# Patient Record
Sex: FEMALE | Race: BLACK OR AFRICAN AMERICAN | URBAN - METROPOLITAN AREA
[De-identification: names, ages, dates, MRNs, and addresses within clinical notes are randomized per-mention and may not be internally consistent; named-entity substitution may affect disease eponyms.]

---

## 2019-04-23 ENCOUNTER — HOSPITAL ENCOUNTER (INPATIENT)
Age: 39
LOS: 3 days | Discharge: PSYCHIATRIC OR SUBSTANCE USE FACILITY | DRG: 917 | End: 2019-04-26
Attending: EMERGENCY MEDICINE | Admitting: INTERNAL MEDICINE
Payer: SELF-PAY

## 2019-04-23 ENCOUNTER — APPOINTMENT (OUTPATIENT)
Dept: CT IMAGING | Age: 39
DRG: 917 | End: 2019-04-23
Attending: STUDENT IN AN ORGANIZED HEALTH CARE EDUCATION/TRAINING PROGRAM
Payer: SELF-PAY

## 2019-04-23 DIAGNOSIS — R40.0 SOMNOLENCE: Primary | ICD-10-CM

## 2019-04-23 DIAGNOSIS — T43.201A: ICD-10-CM

## 2019-04-23 PROBLEM — R41.82 ALTERED MENTAL STATUS: Status: ACTIVE | Noted: 2019-04-23

## 2019-04-23 PROBLEM — T50.901A DRUG OVERDOSE: Status: ACTIVE | Noted: 2019-04-23

## 2019-04-23 LAB
ALBUMIN SERPL-MCNC: 4.1 G/DL (ref 3.5–5)
ALBUMIN/GLOB SERPL: 1 {RATIO} (ref 1.1–2.2)
ALP SERPL-CCNC: 82 U/L (ref 45–117)
ALT SERPL-CCNC: 22 U/L (ref 12–78)
AMPHET UR QL SCN: NEGATIVE
ANION GAP SERPL CALC-SCNC: 5 MMOL/L (ref 5–15)
APAP SERPL-MCNC: <2 UG/ML (ref 10–30)
APPEARANCE UR: CLEAR
AST SERPL-CCNC: 17 U/L (ref 15–37)
ATRIAL RATE: 63 BPM
BACTERIA URNS QL MICRO: NEGATIVE /HPF
BARBITURATES UR QL SCN: NEGATIVE
BASOPHILS # BLD: 0 K/UL (ref 0–0.1)
BASOPHILS NFR BLD: 0 % (ref 0–1)
BENZODIAZ UR QL: NEGATIVE
BILIRUB SERPL-MCNC: 0.2 MG/DL (ref 0.2–1)
BILIRUB UR QL: NEGATIVE
BUN SERPL-MCNC: 9 MG/DL (ref 6–20)
BUN/CREAT SERPL: 7 (ref 12–20)
CALCIUM SERPL-MCNC: 9.5 MG/DL (ref 8.5–10.1)
CALCULATED P AXIS, ECG09: 64 DEGREES
CALCULATED R AXIS, ECG10: 63 DEGREES
CALCULATED T AXIS, ECG11: 54 DEGREES
CANNABINOIDS UR QL SCN: POSITIVE
CHLORIDE SERPL-SCNC: 110 MMOL/L (ref 97–108)
CO2 SERPL-SCNC: 28 MMOL/L (ref 21–32)
COCAINE UR QL SCN: POSITIVE
COLOR UR: ABNORMAL
COMMENT, HOLDF: NORMAL
CREAT SERPL-MCNC: 1.38 MG/DL (ref 0.55–1.02)
CREAT UR-MCNC: 154 MG/DL
DIAGNOSIS, 93000: NORMAL
DIFFERENTIAL METHOD BLD: ABNORMAL
DRUG SCRN COMMENT,DRGCM: ABNORMAL
EOSINOPHIL # BLD: 0.1 K/UL (ref 0–0.4)
EOSINOPHIL NFR BLD: 1 % (ref 0–7)
EPITH CASTS URNS QL MICRO: ABNORMAL /LPF
ERYTHROCYTE [DISTWIDTH] IN BLOOD BY AUTOMATED COUNT: 15.8 % (ref 11.5–14.5)
ETHANOL SERPL-MCNC: <10 MG/DL
GLOBULIN SER CALC-MCNC: 4.3 G/DL (ref 2–4)
GLUCOSE BLD STRIP.AUTO-MCNC: 103 MG/DL (ref 65–100)
GLUCOSE BLD STRIP.AUTO-MCNC: 109 MG/DL (ref 65–100)
GLUCOSE BLD STRIP.AUTO-MCNC: 112 MG/DL (ref 65–100)
GLUCOSE SERPL-MCNC: 107 MG/DL (ref 65–100)
GLUCOSE UR STRIP.AUTO-MCNC: NEGATIVE MG/DL
HCG SERPL QL: NEGATIVE
HCT VFR BLD AUTO: 41.9 % (ref 35–47)
HGB BLD-MCNC: 13.3 G/DL (ref 11.5–16)
HGB UR QL STRIP: NEGATIVE
HYALINE CASTS URNS QL MICRO: ABNORMAL /LPF (ref 0–5)
IMM GRANULOCYTES # BLD AUTO: 0.1 K/UL (ref 0–0.04)
IMM GRANULOCYTES NFR BLD AUTO: 1 % (ref 0–0.5)
KETONES UR QL STRIP.AUTO: NEGATIVE MG/DL
LEUKOCYTE ESTERASE UR QL STRIP.AUTO: ABNORMAL
LYMPHOCYTES # BLD: 1.7 K/UL (ref 0.8–3.5)
LYMPHOCYTES NFR BLD: 15 % (ref 12–49)
MCH RBC QN AUTO: 28.8 PG (ref 26–34)
MCHC RBC AUTO-ENTMCNC: 31.7 G/DL (ref 30–36.5)
MCV RBC AUTO: 90.7 FL (ref 80–99)
METHADONE UR QL: NEGATIVE
MONOCYTES # BLD: 0.4 K/UL (ref 0–1)
MONOCYTES NFR BLD: 4 % (ref 5–13)
NEUTS SEG # BLD: 9 K/UL (ref 1.8–8)
NEUTS SEG NFR BLD: 79 % (ref 32–75)
NITRITE UR QL STRIP.AUTO: NEGATIVE
NRBC # BLD: 0 K/UL (ref 0–0.01)
NRBC BLD-RTO: 0 PER 100 WBC
OPIATES UR QL: NEGATIVE
P-R INTERVAL, ECG05: 138 MS
PCP UR QL: NEGATIVE
PH UR STRIP: 7 [PH] (ref 5–8)
PLATELET # BLD AUTO: 376 K/UL (ref 150–400)
PMV BLD AUTO: 9.6 FL (ref 8.9–12.9)
POTASSIUM SERPL-SCNC: 4.7 MMOL/L (ref 3.5–5.1)
PROT SERPL-MCNC: 8.4 G/DL (ref 6.4–8.2)
PROT UR STRIP-MCNC: NEGATIVE MG/DL
Q-T INTERVAL, ECG07: 438 MS
QRS DURATION, ECG06: 84 MS
QTC CALCULATION (BEZET), ECG08: 448 MS
RBC # BLD AUTO: 4.62 M/UL (ref 3.8–5.2)
RBC #/AREA URNS HPF: ABNORMAL /HPF (ref 0–5)
SALICYLATES SERPL-MCNC: <1.7 MG/DL (ref 2.8–20)
SAMPLES BEING HELD,HOLD: NORMAL
SERVICE CMNT-IMP: ABNORMAL
SODIUM SERPL-SCNC: 143 MMOL/L (ref 136–145)
SP GR UR REFRACTOMETRY: 1.01 (ref 1–1.03)
UROBILINOGEN UR QL STRIP.AUTO: 0.2 EU/DL (ref 0.2–1)
VENTRICULAR RATE, ECG03: 63 BPM
WBC # BLD AUTO: 11.3 K/UL (ref 3.6–11)
WBC URNS QL MICRO: ABNORMAL /HPF (ref 0–4)

## 2019-04-23 PROCEDURE — 93005 ELECTROCARDIOGRAM TRACING: CPT

## 2019-04-23 PROCEDURE — 84133 ASSAY OF URINE POTASSIUM: CPT

## 2019-04-23 PROCEDURE — 74011250636 HC RX REV CODE- 250/636: Performed by: INTERNAL MEDICINE

## 2019-04-23 PROCEDURE — 81001 URINALYSIS AUTO W/SCOPE: CPT

## 2019-04-23 PROCEDURE — 51701 INSERT BLADDER CATHETER: CPT

## 2019-04-23 PROCEDURE — 36415 COLL VENOUS BLD VENIPUNCTURE: CPT

## 2019-04-23 PROCEDURE — 96374 THER/PROPH/DIAG INJ IV PUSH: CPT

## 2019-04-23 PROCEDURE — 82570 ASSAY OF URINE CREATININE: CPT

## 2019-04-23 PROCEDURE — 74011000258 HC RX REV CODE- 258: Performed by: INTERNAL MEDICINE

## 2019-04-23 PROCEDURE — 84703 CHORIONIC GONADOTROPIN ASSAY: CPT

## 2019-04-23 PROCEDURE — 80053 COMPREHEN METABOLIC PANEL: CPT

## 2019-04-23 PROCEDURE — 65660000000 HC RM CCU STEPDOWN

## 2019-04-23 PROCEDURE — 74011250636 HC RX REV CODE- 250/636: Performed by: EMERGENCY MEDICINE

## 2019-04-23 PROCEDURE — 99285 EMERGENCY DEPT VISIT HI MDM: CPT

## 2019-04-23 PROCEDURE — 84300 ASSAY OF URINE SODIUM: CPT

## 2019-04-23 PROCEDURE — 77030013079 HC BLNKT BAIR HGGR 3M -A

## 2019-04-23 PROCEDURE — 80307 DRUG TEST PRSMV CHEM ANLYZR: CPT

## 2019-04-23 PROCEDURE — 82962 GLUCOSE BLOOD TEST: CPT

## 2019-04-23 PROCEDURE — 77030005563 HC CATH URETH INT MMGH -A

## 2019-04-23 PROCEDURE — 85025 COMPLETE CBC W/AUTO DIFF WBC: CPT

## 2019-04-23 PROCEDURE — 70450 CT HEAD/BRAIN W/O DYE: CPT

## 2019-04-23 RX ORDER — SODIUM CHLORIDE 0.9 % (FLUSH) 0.9 %
5-40 SYRINGE (ML) INJECTION EVERY 8 HOURS
Status: DISCONTINUED | OUTPATIENT
Start: 2019-04-23 | End: 2019-04-26 | Stop reason: HOSPADM

## 2019-04-23 RX ORDER — DEXTROSE MONOHYDRATE AND SODIUM CHLORIDE 5; .45 G/100ML; G/100ML
75 INJECTION, SOLUTION INTRAVENOUS CONTINUOUS
Status: DISCONTINUED | OUTPATIENT
Start: 2019-04-23 | End: 2019-04-25

## 2019-04-23 RX ORDER — SODIUM CHLORIDE 0.9 % (FLUSH) 0.9 %
5-40 SYRINGE (ML) INJECTION AS NEEDED
Status: DISCONTINUED | OUTPATIENT
Start: 2019-04-23 | End: 2019-04-26 | Stop reason: HOSPADM

## 2019-04-23 RX ORDER — INSULIN LISPRO 100 [IU]/ML
INJECTION, SOLUTION INTRAVENOUS; SUBCUTANEOUS
Status: DISCONTINUED | OUTPATIENT
Start: 2019-04-23 | End: 2019-04-26 | Stop reason: HOSPADM

## 2019-04-23 RX ORDER — HEPARIN SODIUM 5000 [USP'U]/ML
5000 INJECTION, SOLUTION INTRAVENOUS; SUBCUTANEOUS EVERY 8 HOURS
Status: DISCONTINUED | OUTPATIENT
Start: 2019-04-23 | End: 2019-04-26 | Stop reason: HOSPADM

## 2019-04-23 RX ORDER — DEXTROSE MONOHYDRATE 25 G/50ML
12.5-25 INJECTION, SOLUTION INTRAVENOUS AS NEEDED
Status: DISCONTINUED | OUTPATIENT
Start: 2019-04-23 | End: 2019-04-26 | Stop reason: HOSPADM

## 2019-04-23 RX ORDER — NALOXONE HYDROCHLORIDE 1 MG/ML
2 INJECTION INTRAMUSCULAR; INTRAVENOUS; SUBCUTANEOUS
Status: COMPLETED | OUTPATIENT
Start: 2019-04-23 | End: 2019-04-23

## 2019-04-23 RX ORDER — DEXTROSE MONOHYDRATE AND SODIUM CHLORIDE 5; .9 G/100ML; G/100ML
75 INJECTION, SOLUTION INTRAVENOUS CONTINUOUS
Status: DISCONTINUED | OUTPATIENT
Start: 2019-04-23 | End: 2019-04-23

## 2019-04-23 RX ORDER — MAGNESIUM SULFATE 100 %
4 CRYSTALS MISCELLANEOUS AS NEEDED
Status: DISCONTINUED | OUTPATIENT
Start: 2019-04-23 | End: 2019-04-26 | Stop reason: HOSPADM

## 2019-04-23 RX ADMIN — NALOXONE HYDROCHLORIDE 2 MG: 1 INJECTION PARENTERAL at 10:18

## 2019-04-23 RX ADMIN — Medication 10 ML: at 21:07

## 2019-04-23 RX ADMIN — DEXTROSE MONOHYDRATE AND SODIUM CHLORIDE 75 ML/HR: 5; .45 INJECTION, SOLUTION INTRAVENOUS at 19:36

## 2019-04-23 RX ADMIN — HEPARIN SODIUM 5000 UNITS: 5000 INJECTION INTRAVENOUS; SUBCUTANEOUS at 21:08

## 2019-04-23 RX ADMIN — Medication 10 ML: at 19:36

## 2019-04-23 NOTE — PROGRESS NOTES
New admission, pt alert, only oriented to self, family at bedside. Pt states her name is Luis Felipe Decker but to call her Josseline Bearded. Pt cannot tell writer her , current month/year, situation current location. Pt states she does not have ID. Using Repetitive word, \"Kenia\". Unable to complete admission data as pt is only alert to self. Sitter at bedside. Pt made comfortable will continue to monitor pt.

## 2019-04-23 NOTE — ED PROVIDER NOTES
EMERGENCY DEPARTMENT HISTORY AND PHYSICAL EXAM      Date: 4/23/2019  Patient Name: Verla Hodgkin Doe    History of Presenting Illness     Chief Complaint   Patient presents with    Drug Overdose     pt found in hotel possibly took 18 seroquel, family found pt       History Provided By: EMS    HPI: Concetta Dickerson, 42 y.o. female with PMHx significant for unknown diagnoses, presents via EMS to the ED with cc of altered mental status secondary to presumed drug overdose. Per EMS report, patient was found at a hotel with pill bottles of Seroquel and Baclofen nearby. She was known to be minimally responsive on initial evaluation, but had appropriate responses to sternal rub. Patient presents protecting her airway, bilateral breath sounds presents, and intact distal pulses. There are no other complaints, changes, or physical findings at this time. PCP: No primary care provider on file. No current facility-administered medications on file prior to encounter. No current outpatient medications on file prior to encounter. Past History     Past Medical History:  No past medical history on file. Past Surgical History:  No past surgical history on file. Family History:  No family history on file. Social History:  Social History     Tobacco Use    Smoking status: Not on file   Substance Use Topics    Alcohol use: Not on file    Drug use: Not on file       Allergies: Allergies not on file      Review of Systems   Review of Systems   Unable to perform ROS: Patient unresponsive       Physical Exam   Physical Exam   Constitutional: She appears well-developed and well-nourished. She appears lethargic. HENT:   Head: Normocephalic and atraumatic. Eyes: Pupils are equal, round, and reactive to light. Conjunctivae and EOM are normal.   Neck: Normal range of motion. Neck supple. Cardiovascular: Normal rate, regular rhythm and normal heart sounds.    Pulmonary/Chest: Effort normal and breath sounds normal. Abdominal: Soft. Bowel sounds are normal.   Musculoskeletal: She exhibits no edema or deformity. Neurological: She appears lethargic. She is disoriented. GCS eye subscore is 2. GCS verbal subscore is 2. GCS motor subscore is 4. Skin: Skin is warm and dry. She is not diaphoretic. Diagnostic Study Results     Labs -     Recent Results (from the past 12 hour(s))   GLUCOSE, POC    Collection Time: 04/23/19  9:56 AM   Result Value Ref Range    Glucose (POC) 103 (H) 65 - 100 mg/dL    Performed by Alda Guillen    CBC WITH AUTOMATED DIFF    Collection Time: 04/23/19 10:00 AM   Result Value Ref Range    WBC 11.3 (H) 3.6 - 11.0 K/uL    RBC 4.62 3.80 - 5.20 M/uL    HGB 13.3 11.5 - 16.0 g/dL    HCT 41.9 35.0 - 47.0 %    MCV 90.7 80.0 - 99.0 FL    MCH 28.8 26.0 - 34.0 PG    MCHC 31.7 30.0 - 36.5 g/dL    RDW 15.8 (H) 11.5 - 14.5 %    PLATELET 705 941 - 769 K/uL    MPV 9.6 8.9 - 12.9 FL    NRBC 0.0 0  WBC    ABSOLUTE NRBC 0.00 0.00 - 0.01 K/uL    NEUTROPHILS 79 (H) 32 - 75 %    LYMPHOCYTES 15 12 - 49 %    MONOCYTES 4 (L) 5 - 13 %    EOSINOPHILS 1 0 - 7 %    BASOPHILS 0 0 - 1 %    IMMATURE GRANULOCYTES 1 (H) 0.0 - 0.5 %    ABS. NEUTROPHILS 9.0 (H) 1.8 - 8.0 K/UL    ABS. LYMPHOCYTES 1.7 0.8 - 3.5 K/UL    ABS. MONOCYTES 0.4 0.0 - 1.0 K/UL    ABS. EOSINOPHILS 0.1 0.0 - 0.4 K/UL    ABS. BASOPHILS 0.0 0.0 - 0.1 K/UL    ABS. IMM.  GRANS. 0.1 (H) 0.00 - 0.04 K/UL    DF AUTOMATED     DRUG SCREEN, URINE    Collection Time: 04/23/19 10:00 AM   Result Value Ref Range    AMPHETAMINES NEGATIVE  NEG      BARBITURATES NEGATIVE  NEG      BENZODIAZEPINES NEGATIVE  NEG      COCAINE POSITIVE (A) NEG      METHADONE NEGATIVE  NEG      OPIATES NEGATIVE  NEG      PCP(PHENCYCLIDINE) NEGATIVE  NEG      THC (TH-CANNABINOL) POSITIVE (A) NEG      Drug screen comment (NOTE)    METABOLIC PANEL, COMPREHENSIVE    Collection Time: 04/23/19 10:00 AM   Result Value Ref Range    Sodium 143 136 - 145 mmol/L    Potassium 4.7 3.5 - 5.1 mmol/L Chloride 110 (H) 97 - 108 mmol/L    CO2 28 21 - 32 mmol/L    Anion gap 5 5 - 15 mmol/L    Glucose 107 (H) 65 - 100 mg/dL    BUN 9 6 - 20 MG/DL    Creatinine 1.38 (H) 0.55 - 1.02 MG/DL    BUN/Creatinine ratio 7 (L) 12 - 20      GFR est AA 50 (L) >60 ml/min/1.73m2    GFR est non-AA 42 (L) >60 ml/min/1.73m2    Calcium 9.5 8.5 - 10.1 MG/DL    Bilirubin, total 0.2 0.2 - 1.0 MG/DL    ALT (SGPT) 22 12 - 78 U/L    AST (SGOT) 17 15 - 37 U/L    Alk. phosphatase 82 45 - 117 U/L    Protein, total 8.4 (H) 6.4 - 8.2 g/dL    Albumin 4.1 3.5 - 5.0 g/dL    Globulin 4.3 (H) 2.0 - 4.0 g/dL    A-G Ratio 1.0 (L) 1.1 - 2.2     SAMPLES BEING HELD    Collection Time: 04/23/19 10:00 AM   Result Value Ref Range    SAMPLES BEING HELD RED     COMMENT        Add-on orders for these samples will be processed based on acceptable specimen integrity and analyte stability, which may vary by analyte. HCG QL SERUM    Collection Time: 04/23/19 10:00 AM   Result Value Ref Range    HCG, Ql. NEGATIVE  NEG     ETHYL ALCOHOL    Collection Time: 04/23/19 10:04 AM   Result Value Ref Range    ALCOHOL(ETHYL),SERUM <93 <40 MG/DL   SALICYLATE    Collection Time: 04/23/19 10:04 AM   Result Value Ref Range    Salicylate level <6.6 (L) 2.8 - 20.0 MG/DL   ACETAMINOPHEN    Collection Time: 04/23/19 10:04 AM   Result Value Ref Range    Acetaminophen level <2 (L) 10 - 30 ug/mL       Radiologic Studies -   No orders to display     CT Results  (Last 48 hours)    None        CXR Results  (Last 48 hours)    None            Medical Decision Making   I am the first provider for this patient. I reviewed the vital signs, available nursing notes, past medical history, past surgical history, family history and social history. Vital Signs-Reviewed the patient's vital signs.   Patient Vitals for the past 12 hrs:   Temp Pulse Resp BP SpO2   04/23/19 1054  61   98 %   04/23/19 1036  72   97 %   04/23/19 1030  67  142/89 98 %   04/23/19 1008  68   98 %   04/23/19 1000  66  127/81 99 %   04/23/19 0958  71   98 %   04/23/19 0957 95.7 °F (35.4 °C) 62 22 129/82 98 %   04/23/19 0955    129/82        Pulse Oximetry Analysis - 99% on room air    Cardiac Monitor:   Rate: 65 bpm  Rhythm: Normal Sinus Rhythm     EKG interpretation: (Preliminary)  Rhythm: normal sinus rhythm; and regular . Rate (approx.): 60; Axis: normal; ME interval: normal; QRS interval: normal ; ST/T wave: T wave inverted; Other findings: borderline ekg. Records Reviewed: Ambulance Run Sheet    Provider Notes (Medical Decision Making):   Patient Miriam Brown is a ~46 YOF who presents with the above stated history, ROS, and physical exam. Patient currently protecting her airway, bilateral breath sounds present, intact distal pulses, and GCS of 8. Current clinical concern for drug overdose (intentional versus accidental) versus suicide attempt. Poison control contacted with recommendations to monitor for 6 hours, keep eyes on QTc intervals, and reassessments for CNS hyperactivity such as seizures. CBC, CMP, UA, urine drug screen, and EKG ordered. Will continue to monitor and reassess. Reassessment @ 13:11:  Patient continues to be somnolent with responses to noxious stimuli. Patient cannot answer questions appropriately. Pending reassessment. Reassessment @ 15:22:  Patient responding to verbal questions. Cannot answer orientation questions related to place, time, or situation. Will look to have patient admitted to the hospital for altered mental status and possible intentional overdose. CONSULT NOTE:   3:26 PM  NEYMAR Lau M.D. spoke with Dr. Anamaria Matute,   Specialty: Hospitalist  Discussed pt's hx, disposition, and available diagnostic and imaging results. Reviewed care plans. Consultant will evaluate pt for admission. ED Course:   Initial assessment performed. The patients presenting problems have been discussed, and they are in agreement with the care plan formulated and outlined with them. I have encouraged them to ask questions as they arise throughout their visit. Critical Care Time:   30 mins    Disposition:  Admission    PLAN:  1. Hospital admission    Diagnosis     Clinical Impression:   1. Somnolence    2. Antidepressant overdose, accidental or unintentional, initial encounter      Lorena Urrutia. Chacha Barajas M.D.  [facility] Emergency Medicine, PGY-2  Work phone: [phone]    Attestations:    I personally performed a history and physical examination of the patient and discussed the management with the resident. I have found the following on physical exam:    No acute distress, regular rate and rhythm, lungs clear, lethargic    I have reviewed the resident's note and agree with the resident's findings, including all diagnostic interpretations, treatment and plan of care, except as documented below. I was present during the key portions of separately billed procedures.     Ed Ricci MD

## 2019-04-23 NOTE — ED TRIAGE NOTES
Pt arrives via EMS with c/o drug over dose, per EMS pt was found by family and potentially took 18 mg of seroquel. Pt responds to pain only. Vitals WDL. MD at bedside to evaluate pt at this time.  on arrival. 
 
Pt placed x3 on monitor.

## 2019-04-23 NOTE — H&P
Hospitalist Admission NoteNAME: Vera Angel :  1973 MRN:  [id number] Date/Time:  2019 4:28 PM 
 
Patient PCP: No primary care provider on file. 
________________________________________________________________________ Given the patient's current clinical presentation, I have a high level of concern for decompensation if discharged from the emergency department. Complex decision making was performed, which includes reviewing the patient's available past medical records, laboratory results, and x-ray films. My assessment of this patient's clinical condition and my plan of care is as follows. Assessment / Plan: Altered mental status most likely secondary to Seroquel overdose versus cocaine abuse Per EMS , pt took 18tabs of seroquel  And UDS + cocaine Ct scan brain negative . Will do neuro-checks q4h , EKG q4H x3 then one on AM  
suical precautions . 1:1 as unable to assess if pt is suicidal  
Psych consulted . If persistent AMS in am  , consult neuro Will get b12 folate ammonia, UA , TSH  to be complete Keep npo until more alert and awake Avoid qtc prolonging agents , hold all PO home  meds Acute kidney injury , pre-renal  
Creat 1.38m, will get UA urine electrolytes H/o DM2 Bs ok Sliding scale for now Get med rec Bipolar disease On seroquel and baclofen Get med rec NOK : unable to obtain There is no height or weight on file to calculate BMI. therefore classifying patient as unable to get I have personally reviewed the radiographs, laboratory data in Epic and decisions and statements above are based partially on this personal interpretation. Code Status: Full Code DVT Prophylaxis: Hep SQ 
GI Prophylaxis: not indicated Subjective: CHIEF COMPLAINT: sent by EMS for AMS HISTORY OF PRESENT ILLNESS:    
Nazario Monsivais is a 42 y.o.    female with known history as listed below presents to ED with complaint noted above. Available records were reviewed at the time of H&P. This is a 44-year-old female with past medical history of bipolar disease, diabetes was found semi conscious sitting in a chair  at her home by the EMS, family at the scene reported that patient vomited in the bathroom and was found in her current condition when they came in the room and they suspect that she took approximately 25 Seroquel that she was prescribed.  Per EMS note, family reported that patient  Does not  have a history of drug abuse or suicide . When seen, no family at bedside and no contact information on file. Unclear baseline mental status Review of her labs showed that her UDS was positive for cocaine and cannabinol .  Her labs revealed acute kidney injury Her EKG showed a QTC of 440, Poison control was called by the ED physician, poison control  Advised 6 hours of observation and frequent QTC check CT scan of the brain was negative for any acute pathology. Review of systems unable to be obtained as patient is still confused We were asked to see for work up and evaluation of the above problems. No past medical history on file. No past surgical history on file. Social History Tobacco Use  Smoking status: Not on file Substance Use Topics  Alcohol use: Not on file History reviewed. No pertinent family history. Allergies not on file Prior to Admission medications Not on File REVIEW OF SYSTEMS:  See HPI for details Patient was not able to provide review of systems due to mental status change/acute illness Objective: VITALS:   
Visit Vitals /68 Pulse 63 Temp 97.8 °F (36.6 °C) Resp 24 SpO2 94% PHYSICAL EXAM:  
General:    Sleepy but arousable , follow some commands , no distress, appears stated age. HEENT: Atraumatic, anicteric sclerae, pink conjunctivae No oral ulcers, mucosa moist, throat clear Neck:  Supple, symmetrical,  thyroid: non tender Lungs:   Clear to auscultation bilaterally. No Wheezing or Rhonchi. No rales. Chest wall:  No tenderness  No Accessory muscle use. Heart:   Regular  rhythm,  No  murmur   No edema Abdomen:   Soft, non-tender. Not distended. Bowel sounds normal 
Extremities: No cyanosis. No clubbing Skin:     Not pale. Not Jaundiced  No rashes Psych:  Unable to assess Neurologic: EOMs intact. No facial asymmetry. No aphasia or slurred speech. Symmetrical strength, Alert and oriented X 0. 
_______________________________________________________________________ Care Plan discussed with: 
  Comments Patient x Seen and examined Family RN x Care Manager Consultant:  chase MONTIEL MD  
_______________________________________________________________________ Recommended Disposition:  
Home with Family HH/PT/OT/RN   
SNF/LTC   
GARCIA   
________________________________________________________________________ TOTAL TIME:  65 Minutes Critical Care Provided     Minutes non procedure based Comments >50% of visit spent in counseling and coordination of care x Chart review Discussion with patient and/or family and questions answered  
 
________________________________________________________________________ Signed: Bebeto Sloan MD 
 
This note will not be viewable in [address] Procedures: see electronic medical records for all procedures/Xrays and details which were not copied into this note but were reviewed prior to creation of Plan. LAB DATA REVIEWED:   
Recent Results (from the past 24 hour(s)) GLUCOSE, POC Collection Time: 04/23/19  9:56 AM  
Result Value Ref Range Glucose (POC) 103 (H) 65 - 100 mg/dL Performed by [facility] EKG, 12 LEAD, INITIAL Collection Time: 04/23/19  9:57 AM  
Result Value Ref Range Ventricular Rate 63 BPM  
 Atrial Rate 63 BPM  
 P-R Interval 138 ms QRS Duration 84 ms Q-T Interval 438 ms QTC Calculation (Bezet) 448 ms Calculated P Axis 64 degrees Calculated R Axis 63 degrees Calculated T Axis 54 degrees Diagnosis Normal sinus rhythm Possible Left atrial enlargement ST elevation, consider early repolarization, pericarditis, or injury Confirmed by Jhon Buck ([id number]) on 4/23/2019 1:51:50 PM 
  
CBC WITH AUTOMATED DIFF Collection Time: 04/23/19 10:00 AM  
Result Value Ref Range WBC 11.3 (H) 3.6 - 11.0 K/uL  
 RBC 4.62 3.80 - 5.20 M/uL  
 HGB 13.3 11.5 - 16.0 g/dL HCT 41.9 35.0 - 47.0 % MCV 90.7 80.0 - 99.0 FL  
 MCH 28.8 26.0 - 34.0 PG  
 MCHC 31.7 30.0 - 36.5 g/dL  
 RDW 15.8 (H) 11.5 - 14.5 % PLATELET 468 576 - 128 K/uL MPV 9.6 8.9 - 12.9 FL  
 NRBC 0.0 0  WBC ABSOLUTE NRBC 0.00 0.00 - 0.01 K/uL NEUTROPHILS 79 (H) 32 - 75 % LYMPHOCYTES 15 12 - 49 % MONOCYTES 4 (L) 5 - 13 % EOSINOPHILS 1 0 - 7 % BASOPHILS 0 0 - 1 % IMMATURE GRANULOCYTES 1 (H) 0.0 - 0.5 % ABS. NEUTROPHILS 9.0 (H) 1.8 - 8.0 K/UL  
 ABS. LYMPHOCYTES 1.7 0.8 - 3.5 K/UL  
 ABS. MONOCYTES 0.4 0.0 - 1.0 K/UL  
 ABS. EOSINOPHILS 0.1 0.0 - 0.4 K/UL  
 ABS. BASOPHILS 0.0 0.0 - 0.1 K/UL  
 ABS. IMM. GRANS. 0.1 (H) 0.00 - 0.04 K/UL  
 DF AUTOMATED    
DRUG SCREEN, URINE Collection Time: 04/23/19 10:00 AM  
Result Value Ref Range AMPHETAMINES NEGATIVE  NEG    
 BARBITURATES NEGATIVE  NEG BENZODIAZEPINES NEGATIVE  NEG    
 COCAINE POSITIVE (A) NEG METHADONE NEGATIVE  NEG    
 OPIATES NEGATIVE  NEG    
 PCP(PHENCYCLIDINE) NEGATIVE  NEG    
 THC (TH-CANNABINOL) POSITIVE (A) NEG Drug screen comment (NOTE) METABOLIC PANEL, COMPREHENSIVE Collection Time: 04/23/19 10:00 AM  
Result Value Ref Range Sodium 143 136 - 145 mmol/L Potassium 4.7 3.5 - 5.1 mmol/L Chloride 110 (H) 97 - 108 mmol/L  
 CO2 28 21 - 32 mmol/L Anion gap 5 5 - 15 mmol/L Glucose 107 (H) 65 - 100 mg/dL  BUN 9 6 - 20 MG/DL  
 Creatinine 1.38 (H) 0.55 - 1.02 MG/DL  
 BUN/Creatinine ratio 7 (L) 12 - 20 GFR est AA 50 (L) >60 ml/min/1.73m2 GFR est non-AA 42 (L) >60 ml/min/1.73m2 Calcium 9.5 8.5 - 10.1 MG/DL Bilirubin, total 0.2 0.2 - 1.0 MG/DL  
 ALT (SGPT) 22 12 - 78 U/L  
 AST (SGOT) 17 15 - 37 U/L Alk. phosphatase 82 45 - 117 U/L Protein, total 8.4 (H) 6.4 - 8.2 g/dL Albumin 4.1 3.5 - 5.0 g/dL Globulin 4.3 (H) 2.0 - 4.0 g/dL A-G Ratio 1.0 (L) 1.1 - 2.2 SAMPLES BEING HELD Collection Time: 04/23/19 10:00 AM  
Result Value Ref Range SAMPLES BEING HELD RED COMMENT Add-on orders for these samples will be processed based on acceptable specimen integrity and analyte stability, which may vary by analyte. HCG QL SERUM Collection Time: 04/23/19 10:00 AM  
Result Value Ref Range HCG, Ql. NEGATIVE  NEG    
ETHYL ALCOHOL Collection Time: 04/23/19 10:04 AM  
Result Value Ref Range ALCOHOL(ETHYL),SERUM <35 <17 MG/DL  
SALICYLATE Collection Time: 04/23/19 10:04 AM  
Result Value Ref Range Salicylate level <4.2 (L) 2.8 - 20.0 MG/DL  
ACETAMINOPHEN Collection Time: 04/23/19 10:04 AM  
Result Value Ref Range Acetaminophen level <2 (L) 10 - 30 ug/mL

## 2019-04-24 LAB
AMMONIA PLAS-SCNC: 27 UMOL/L
ANION GAP SERPL CALC-SCNC: 6 MMOL/L (ref 5–15)
ATRIAL RATE: 61 BPM
BASOPHILS # BLD: 0 K/UL (ref 0–0.1)
BASOPHILS NFR BLD: 0 % (ref 0–1)
BUN SERPL-MCNC: 10 MG/DL (ref 6–20)
BUN/CREAT SERPL: 8 (ref 12–20)
CALCIUM SERPL-MCNC: 8.9 MG/DL (ref 8.5–10.1)
CALCULATED P AXIS, ECG09: 61 DEGREES
CALCULATED R AXIS, ECG10: 54 DEGREES
CALCULATED T AXIS, ECG11: 54 DEGREES
CHLORIDE SERPL-SCNC: 110 MMOL/L (ref 97–108)
CO2 SERPL-SCNC: 27 MMOL/L (ref 21–32)
CREAT SERPL-MCNC: 1.2 MG/DL (ref 0.55–1.02)
DIAGNOSIS, 93000: NORMAL
DIFFERENTIAL METHOD BLD: ABNORMAL
EOSINOPHIL # BLD: 0.1 K/UL (ref 0–0.4)
EOSINOPHIL NFR BLD: 1 % (ref 0–7)
ERYTHROCYTE [DISTWIDTH] IN BLOOD BY AUTOMATED COUNT: 15.9 % (ref 11.5–14.5)
FOLATE SERPL-MCNC: 14.1 NG/ML (ref 5–21)
GLUCOSE BLD STRIP.AUTO-MCNC: 101 MG/DL (ref 65–100)
GLUCOSE BLD STRIP.AUTO-MCNC: 108 MG/DL (ref 65–100)
GLUCOSE BLD STRIP.AUTO-MCNC: 88 MG/DL (ref 65–100)
GLUCOSE SERPL-MCNC: 95 MG/DL (ref 65–100)
HCT VFR BLD AUTO: 38 % (ref 35–47)
HGB BLD-MCNC: 12.2 G/DL (ref 11.5–16)
IMM GRANULOCYTES # BLD AUTO: 0 K/UL (ref 0–0.04)
IMM GRANULOCYTES NFR BLD AUTO: 0 % (ref 0–0.5)
LYMPHOCYTES # BLD: 2.7 K/UL (ref 0.8–3.5)
LYMPHOCYTES NFR BLD: 23 % (ref 12–49)
MCH RBC QN AUTO: 28.5 PG (ref 26–34)
MCHC RBC AUTO-ENTMCNC: 32.1 G/DL (ref 30–36.5)
MCV RBC AUTO: 88.8 FL (ref 80–99)
MONOCYTES # BLD: 0.7 K/UL (ref 0–1)
MONOCYTES NFR BLD: 6 % (ref 5–13)
NEUTS SEG # BLD: 8.4 K/UL (ref 1.8–8)
NEUTS SEG NFR BLD: 70 % (ref 32–75)
NRBC # BLD: 0 K/UL (ref 0–0.01)
NRBC BLD-RTO: 0 PER 100 WBC
P-R INTERVAL, ECG05: 152 MS
PLATELET # BLD AUTO: 381 K/UL (ref 150–400)
PMV BLD AUTO: 9.5 FL (ref 8.9–12.9)
POTASSIUM SERPL-SCNC: 3.8 MMOL/L (ref 3.5–5.1)
POTASSIUM UR-SCNC: 99 MMOL/L
Q-T INTERVAL, ECG07: 438 MS
QRS DURATION, ECG06: 78 MS
QTC CALCULATION (BEZET), ECG08: 440 MS
RBC # BLD AUTO: 4.28 M/UL (ref 3.8–5.2)
SERVICE CMNT-IMP: ABNORMAL
SERVICE CMNT-IMP: ABNORMAL
SERVICE CMNT-IMP: NORMAL
SODIUM SERPL-SCNC: 143 MMOL/L (ref 136–145)
SODIUM UR-SCNC: 106 MMOL/L
TSH SERPL DL<=0.05 MIU/L-ACNC: 0.29 UIU/ML (ref 0.36–3.74)
VENTRICULAR RATE, ECG03: 61 BPM
VIT B12 SERPL-MCNC: 472 PG/ML (ref 193–986)
WBC # BLD AUTO: 12 K/UL (ref 3.6–11)

## 2019-04-24 PROCEDURE — 36415 COLL VENOUS BLD VENIPUNCTURE: CPT

## 2019-04-24 PROCEDURE — 82746 ASSAY OF FOLIC ACID SERUM: CPT

## 2019-04-24 PROCEDURE — 84443 ASSAY THYROID STIM HORMONE: CPT

## 2019-04-24 PROCEDURE — 82607 VITAMIN B-12: CPT

## 2019-04-24 PROCEDURE — 82140 ASSAY OF AMMONIA: CPT

## 2019-04-24 PROCEDURE — 74011250636 HC RX REV CODE- 250/636: Performed by: INTERNAL MEDICINE

## 2019-04-24 PROCEDURE — 82962 GLUCOSE BLOOD TEST: CPT

## 2019-04-24 PROCEDURE — 74011250637 HC RX REV CODE- 250/637: Performed by: PSYCHIATRY & NEUROLOGY

## 2019-04-24 PROCEDURE — 65660000000 HC RM CCU STEPDOWN

## 2019-04-24 PROCEDURE — 74011000258 HC RX REV CODE- 258: Performed by: INTERNAL MEDICINE

## 2019-04-24 PROCEDURE — 80048 BASIC METABOLIC PNL TOTAL CA: CPT

## 2019-04-24 PROCEDURE — 85025 COMPLETE CBC W/AUTO DIFF WBC: CPT

## 2019-04-24 RX ORDER — QUETIAPINE FUMARATE 200 MG/1
200 TABLET, FILM COATED ORAL
Status: ON HOLD | COMMUNITY
End: 2019-04-30 | Stop reason: SDUPTHER

## 2019-04-24 RX ORDER — BACLOFEN 20 MG/1
20 TABLET ORAL DAILY
COMMUNITY
End: 2019-04-26

## 2019-04-24 RX ORDER — QUETIAPINE 200 MG/1
200 TABLET, FILM COATED, EXTENDED RELEASE ORAL EVERY EVENING
Status: DISCONTINUED | OUTPATIENT
Start: 2019-04-24 | End: 2019-04-24

## 2019-04-24 RX ORDER — HYDROXYZINE 50 MG/1
50 TABLET, FILM COATED ORAL
Status: DISCONTINUED | OUTPATIENT
Start: 2019-04-24 | End: 2019-04-26 | Stop reason: HOSPADM

## 2019-04-24 RX ORDER — BACLOFEN 20 MG/1
20 TABLET ORAL
COMMUNITY
End: 2019-04-30

## 2019-04-24 RX ORDER — QUETIAPINE FUMARATE 100 MG/1
200 TABLET, FILM COATED ORAL EVERY EVENING
Status: DISCONTINUED | OUTPATIENT
Start: 2019-04-24 | End: 2019-04-26 | Stop reason: HOSPADM

## 2019-04-24 RX ADMIN — QUETIAPINE FUMARATE 200 MG: 100 TABLET ORAL at 21:28

## 2019-04-24 RX ADMIN — HEPARIN SODIUM 5000 UNITS: 5000 INJECTION INTRAVENOUS; SUBCUTANEOUS at 13:39

## 2019-04-24 RX ADMIN — HEPARIN SODIUM 5000 UNITS: 5000 INJECTION INTRAVENOUS; SUBCUTANEOUS at 21:28

## 2019-04-24 RX ADMIN — HEPARIN SODIUM 5000 UNITS: 5000 INJECTION INTRAVENOUS; SUBCUTANEOUS at 05:07

## 2019-04-24 RX ADMIN — DEXTROSE MONOHYDRATE AND SODIUM CHLORIDE 75 ML/HR: 5; .45 INJECTION, SOLUTION INTRAVENOUS at 13:39

## 2019-04-24 RX ADMIN — HYDROXYZINE HYDROCHLORIDE 50 MG: 50 TABLET, FILM COATED ORAL at 21:47

## 2019-04-24 RX ADMIN — Medication 10 ML: at 21:37

## 2019-04-24 RX ADMIN — Medication 10 ML: at 05:08

## 2019-04-24 RX ADMIN — Medication 10 ML: at 13:39

## 2019-04-24 NOTE — PROGRESS NOTES
Bedside shift change report given to Chung BERRY (oncoming nurse) by Santo Mccurdy RN (offgoing nurse). Report included the following information SBAR, MAR and Cardiac Rhythm NSR. Zone Phone:   [phone] Significant changes during shift:  More alert ,regular diet 1:1 sitter Patient Information Lindsey Hou 42 y.o. 
4/23/2019  9:54 AM by Blel Esquivel MD. Lindsey Hou was admitted from Home 
 
Problem List 
 
Patient Active Problem List  
 Diagnosis Date Noted  Drug overdose 04/23/2019  Altered mental status 04/23/2019 No past medical history on file. Core Measures: CVA: No No 
CHF:No No 
PNA:No No 
 
Activity Status: Up with 1 assist  
 
Supplemental O2: (If Applicable) N/A 
 
LINES AND DRAINS: 
PIV 
 
 
DVT prophylaxis: 
 
Heparin Wounds: (If Applicable) N/A skin intact Patient Safety: 
 
Falls Score Total Score: 3 Safety Level_______ Bed Alarm On? Yes Sitter? Yes 
 
Plan for upcoming shift: seen by psychiatry today, 1:1 sitter Discharge Plan: Yes, ongoing Active Consults: 
IP CONSULT TO PSYCHIATRY

## 2019-04-24 NOTE — CONSULTS
Met with pt in psychiatric consultation. (real name is Jose F Juanjose)  Pt states she took baclofen overdose. Has history of overdose behaviors in the past, somewhat dismissive re suicidality as part of her impetus. Discussed recommendations for inpatient mental health treatment after medical clearance. Pt initially hesitant, but then was receptive. Once medically cleared, pt needs to be offered inpatient mental health treatment. If she declines, she meets threshold for TDO-prescreening by [employer]. Dictated consult to follow.

## 2019-04-24 NOTE — PROGRESS NOTES
Hospitalist Progress Note NAME: Jazmin Stanley :  1973 MRN:  [id number] Assessment / Plan: 
Acute metabolic encephalopathy  Seroquel overdose versus  Polysubstance abuse (Cocaine and THC in urine ) Per EMS , pt took 18tabs of seroquel  And UDS + cocaine and THC Ct scan brain negative B12 folate, ammonia are normal 
UA is normal 
TSH is low EKG shows normal interval 
Poison control recommended telemetry monitoring and seizure monitoring She reports that she was not suicidal 
Consulted psych and waiting on evaluation Start diet Acute kidney injury, pre-renal  
-cr is now 1.20 and improving 
-cont iv fluids and repeat in am  
  
H/o DM2 Sliding scale for now and check poc's Bipolar disease  
hold seroquel and baclofen Psych evaluation Low TSH concern for hyperthyroidism 
-check t4 level Case mgmt working on changing pt's details in [facility] care including her name 
  
  
NOK: Mother 
  
 
  
Code Status: Full Code DVT Prophylaxis: Hep SQ 
GI Prophylaxis: not indicated Body mass index is 37.05 kg/m². Subjective: Chief Complaint / Reason for Physician Visit She is now alert,awake but confused Wants to eat No abdominal pain, nausea or vomiting. Review of Systems: 
Symptom Y/N Comments  Symptom Y/N Comments Fever/Chills    Chest Pain Poor Appetite    Edema Cough    Abdominal Pain Sputum    Joint Pain SOB/BERNARD    Pruritis/Rash Nausea/vomit    Tolerating PT/OT Diarrhea    Tolerating Diet Constipation    Other Could NOT obtain due to:   
 
Objective: VITALS:  
Last 24hrs VS reviewed since prior progress note. Most recent are: 
Patient Vitals for the past 24 hrs: 
 Temp Pulse Resp BP SpO2  
19 1047 98.2 °F (36.8 °C) 73 17 127/76 98 % 19 0844 98.2 °F (36.8 °C) 75 16 113/81 98 % 19 0421 98.6 °F (37 °C) 75 18 108/61 99 % 19 2345 98.1 °F (36.7 °C) 68 16 122/77 99 % 04/23/19 1828 98.4 °F (36.9 °C) 65 18 119/70 99 % 04/23/19 1732  61   97 % 04/23/19 1730    102/53   
04/23/19 1641  70     
04/23/19 1607  81   95 % 04/23/19 1600  76  106/75 91 % 04/23/19 1542  62   98 % 04/23/19 1531  66   97 % 04/23/19 1530    (!) 152/98   
04/23/19 1515  63   94 % 04/23/19 1500  66  135/68 92 % 04/23/19 1447  66   93 % Intake/Output Summary (Last 24 hours) at 4/24/2019 1441 Last data filed at 4/24/2019 1349 Gross per 24 hour Intake 1172.5 ml Output  Net 1172.5 ml PHYSICAL EXAM: 
General: cooperative, no acute distress   
EENT:  EOMI. Anicteric sclerae. MMM Resp:  CTA bilaterally, no wheezing or rales. No accessory muscle use CV:  Regular  rhythm,  No edema GI:  Soft, Non distended, Non tender.  +Bowel sounds Neurologic:  Alert and awake, normal speech, Psych:   Not anxious nor agitated Skin:  No rashes. No jaundice Reviewed most current lab test results and cultures  YES Reviewed most current radiology test results   YES Review and summation of old records today    NO Reviewed patient's current orders and MAR    YES 
PMH/SH reviewed - no change compared to H&P Current Facility-Administered Medications:  
  sodium chloride (NS) flush 5-40 mL, 5-40 mL, IntraVENous, Q8H, Terrence Barillas MD, 10 mL at 04/24/19 1339 
  sodium chloride (NS) flush 5-40 mL, 5-40 mL, IntraVENous, PRN, Mohsen Colon MD 
  heparin (porcine) injection 5,000 Units, 5,000 Units, SubCUTAneous, Q8H, Mohsen Colon MD, 5,000 Units at 04/24/19 1339 
  insulin lispro (HUMALOG) injection, , SubCUTAneous, AC&HS, Mohsen Colon MD, Stopped at 04/23/19 1630 
  glucose chewable tablet 16 g, 4 Tab, Oral, PRN, Mohsen Colon MD 
  dextrose (D50) infusion 12.5-25 g, 12.5-25 g, IntraVENous, PRN, Mohsen Colon MD 
  glucagon (GLUCAGEN) injection 1 mg, 1 mg, IntraMUSCular, PRN, Mohsen Colon MD 
   dextrose 5 % - 0.45% NaCl infusion, 75 mL/hr, IntraVENous, CONTINUOUS, Dahiana Hale MD, Last Rate: 75 mL/hr at 04/24/19 1339, 75 mL/hr at 04/24/19 1339 
________________________________________________________________________ Care Plan discussed with: 
  Comments Patient y Family RN y   
Care Manager Consultant Multidiciplinary team rounds were held today with , nursing, pharmacist and clinical coordinator. Patient's plan of care was discussed; medications were reviewed and discharge planning was addressed. ________________________________________________________________________ Total NON critical care TIME:  35    Minutes Total CRITICAL CARE TIME Spent:   Minutes non procedure based Comments >50% of visit spent in counseling and coordination of care    
________________________________________________________________________ Vignesh Culver MD  
 
Procedures: see electronic medical records for all procedures/Xrays and details which were not copied into this note but were reviewed prior to creation of Plan. LABS: 
I reviewed today's most current labs and imaging studies. Pertinent labs include: 
Recent Labs  
  04/24/19 
0420 04/23/19 
1000 WBC 12.0* 11.3* HGB 12.2 13.3 HCT 38.0 41.9  376 Recent Labs  
  04/24/19 
0420 04/23/19 
1000  143  
K 3.8 4.7 * 110* CO2 27 28 GLU 95 107* BUN 10 9 CREA 1.20* 1.38* CA 8.9 9.5 ALB  --  4.1 TBILI  --  0.2 SGOT  --  17 ALT  --  22 Signed: Vignesh Culver MD

## 2019-04-24 NOTE — PROGRESS NOTES
CM spoke to [address] [place] [address] to verify that patient's prescriptions for Seroquel (last filled 200mg one PO nightly #30 on 19) and Baclofen (last filled 20 mg one PO qday - may take one extra pill per day - #120 on 2019) were written by Dr. Isha Hawley - [phone]. Per Dr. Eliezer Cano office - these prescriptions were written for Gaurav White - JADEN - 1980. This patient's emergency contact is listed as Gianni Vinita - [phone]. CM notified Risk Management to verify ability to telephone emergency contact for patient listed. JAMAR spoke to Gianni Talamantes who states Sonia Robert is indeed her daughter and she is aware that she in the hospital at [facility]. Ms. Mercedes Souza states she was with patient in the hospital last night and will be returning to the hospital today. Ms. Mercedes Souza further confirms that patient lives with her and she will bring any identification and insurance information that she can locate. Bennie Fu, RN, BSN, ACM [address] [facility]   [phone]

## 2019-04-24 NOTE — PROGRESS NOTES
Bedside shift change report given to [address] [place] [address] (oncoming nurse) by Ashlee Rasheed (offgoing nurse). Report included the following information SBAR, MAR and Cardiac Rhythm NSR. Zone Phone:   [phone] Significant changes during shift:  New admit Patient Information Kade Wheeler 
42 y.o. 
4/23/2019  9:54 AM by Mireya Morton MD. Kade Wheeler was admitted from Home 
 
Problem List 
 
Patient Active Problem List  
 Diagnosis Date Noted  Drug overdose 04/23/2019  Altered mental status 04/23/2019 No past medical history on file. Core Measures: CVA: No No 
CHF:No No 
PNA:No No 
 
Activity Status: Up with 2 assist  
 
Supplemental O2: (If Applicable) N/A 
 
LINES AND DRAINS: 
 
PIV 
 
 
DVT prophylaxis: 
 
Heparin Wounds: (If Applicable) N/A skin intact Patient Safety: 
 
Falls Score Total Score: 3 Safety Level_______ Bed Alarm On? Yes Sitter? Yes 
 
Plan for upcoming shift: Safety Discharge Plan: Yes, ongoing Active Consults: 
IP CONSULT TO PSYCHIATRY

## 2019-04-24 NOTE — PROGRESS NOTES
Pharmacy Clarification of the Prior to Admission Medication Regimen Retrospective to the Admission Medication Reconciliation-Follow up Needed The patient was not interviewed regarding clarification of the prior to admission medication regimen due to AMS. No family/friends were present at time of interview. MHT located prescription bottles in the patient's room prescribed for [employer]. MHT asked the patient her , who replied '[birth year]'. MHT called the outpatient pharmacy on the prescription bottles, [facility], [phone], and spoke with Tisha Holguin, pharmacist, who confirmed Shailesh Kimble  is 84, and stated Reshma Turk is only prescribed the two medications below. Last doses administered and compliance are unknown at this time. Information Obtained From: prescription bottles, outpatient pharmacy Recommendations/Findings: The following amendments were made to the patient's active medication list on file at [address]:  
 
1) Additions: QUEtiapine (SEROQUEL) 200 mg tablet 
baclofen (LIORESAL) 20 mg tablet 2) Removals: NONE 3) Changes: NONE 4) Pertinent Pharmacy Findings: 
Updated patient?s preferred outpatient pharmacy to: [address]  
MHT interviewed patient on 19 in the ED, and was waiting to see if the patient's name would be updated on her chart. Patient was registered (on 19) as Geoff Tan The medication history will need to be re-evaluated at a later time during admission when patient is willing/able to participate or if more information is provided. PTA medication list was corrected to the following:  
 
Prior to Admission Medications Prescriptions Last Dose Informant Patient Reported? Taking? QUEtiapine (SEROQUEL) 200 mg tablet Unknown at Unknown time Other Yes No  
Sig: Take 200 mg by mouth nightly.   
baclofen (LIORESAL) 20 mg tablet Unknown at Unknown time Other Yes No  
 Sig: Take 20 mg by mouth daily. Patient prescribed 20 mg daily and an additional 20 mg daily PRN  
baclofen (LIORESAL) 20 mg tablet Unknown at Unknown time Other Yes No  
Sig: Take 20 mg by mouth daily as needed. Patient prescribed 20 mg daily and an additional 20 mg daily PRN Facility-Administered Medications: None Thank you, 
Nazia Oliveira CPhT Medication History Pharmacy Technician

## 2019-04-24 NOTE — PROGRESS NOTES
Bedside and Verbal shift change report given to Paco Chavez RN (oncoming nurse) by ANTONIA Crow RN (offgoing nurse). Report given with SBAR, Kardex, Intake/Output, MAR and Recent Results.

## 2019-04-24 NOTE — PROGRESS NOTES
Bedside shift change report given to Laura (oncoming nurse) by Dilip (offgoing nurse). Report included the following information SBAR, MAR and Cardiac Rhythm NSR. Zone Phone:   [phone] Significant changes during shift:  More alert ,regular diet 1:1 sitter. Psych consult called Patient Information Mikal Tucker 
42 y.o. 
4/23/2019  9:54 AM by Bebeto Sloan MD. Mikal Tucker was admitted from Home 
 
Problem List 
 
Patient Active Problem List  
 Diagnosis Date Noted  Drug overdose 04/23/2019  Altered mental status 04/23/2019 No past medical history on file. Core Measures: CVA: No No 
CHF:No No 
PNA:No No 
 
Activity Status: Up with 1 assist  
 
Supplemental O2: (If Applicable) N/A 
 
LINES AND DRAINS: 
PIV 
 
 
DVT prophylaxis: 
 
Heparin Wounds: (If Applicable) N/A skin intact Patient Safety: 
 
Falls Score Total Score: 4 Safety Level_______ Bed Alarm On? Yes Sitter? Yes 
 
Plan for upcoming shift: Psych to see. 1:1 sitter Discharge Plan: Yes, ongoing Active Consults: 
IP CONSULT TO PSYCHIATRY

## 2019-04-25 LAB
ALBUMIN SERPL-MCNC: 3.2 G/DL (ref 3.5–5)
ALBUMIN/GLOB SERPL: 0.9 {RATIO} (ref 1.1–2.2)
ALP SERPL-CCNC: 69 U/L (ref 45–117)
ALT SERPL-CCNC: 21 U/L (ref 12–78)
ANION GAP SERPL CALC-SCNC: 11 MMOL/L (ref 5–15)
AST SERPL-CCNC: 23 U/L (ref 15–37)
BASOPHILS # BLD: 0 K/UL (ref 0–0.1)
BASOPHILS NFR BLD: 0 % (ref 0–1)
BILIRUB SERPL-MCNC: 0.2 MG/DL (ref 0.2–1)
BUN SERPL-MCNC: 11 MG/DL (ref 6–20)
BUN/CREAT SERPL: 9 (ref 12–20)
CALCIUM SERPL-MCNC: 8.7 MG/DL (ref 8.5–10.1)
CHLORIDE SERPL-SCNC: 107 MMOL/L (ref 97–108)
CO2 SERPL-SCNC: 23 MMOL/L (ref 21–32)
CREAT SERPL-MCNC: 1.21 MG/DL (ref 0.55–1.02)
DIFFERENTIAL METHOD BLD: ABNORMAL
EOSINOPHIL # BLD: 0.1 K/UL (ref 0–0.4)
EOSINOPHIL NFR BLD: 1 % (ref 0–7)
ERYTHROCYTE [DISTWIDTH] IN BLOOD BY AUTOMATED COUNT: 15.6 % (ref 11.5–14.5)
GLOBULIN SER CALC-MCNC: 3.6 G/DL (ref 2–4)
GLUCOSE BLD STRIP.AUTO-MCNC: 103 MG/DL (ref 65–100)
GLUCOSE BLD STRIP.AUTO-MCNC: 110 MG/DL (ref 65–100)
GLUCOSE BLD STRIP.AUTO-MCNC: 97 MG/DL (ref 65–100)
GLUCOSE SERPL-MCNC: 90 MG/DL (ref 65–100)
HCT VFR BLD AUTO: 37.6 % (ref 35–47)
HGB BLD-MCNC: 11.8 G/DL (ref 11.5–16)
IMM GRANULOCYTES # BLD AUTO: 0 K/UL (ref 0–0.04)
IMM GRANULOCYTES NFR BLD AUTO: 0 % (ref 0–0.5)
LYMPHOCYTES # BLD: 3.5 K/UL (ref 0.8–3.5)
LYMPHOCYTES NFR BLD: 41 % (ref 12–49)
MCH RBC QN AUTO: 28.4 PG (ref 26–34)
MCHC RBC AUTO-ENTMCNC: 31.4 G/DL (ref 30–36.5)
MCV RBC AUTO: 90.4 FL (ref 80–99)
MONOCYTES # BLD: 0.5 K/UL (ref 0–1)
MONOCYTES NFR BLD: 5 % (ref 5–13)
NEUTS SEG # BLD: 4.4 K/UL (ref 1.8–8)
NEUTS SEG NFR BLD: 53 % (ref 32–75)
NRBC # BLD: 0 K/UL (ref 0–0.01)
NRBC BLD-RTO: 0 PER 100 WBC
PLATELET # BLD AUTO: 339 K/UL (ref 150–400)
PMV BLD AUTO: 10.5 FL (ref 8.9–12.9)
POTASSIUM SERPL-SCNC: 3.5 MMOL/L (ref 3.5–5.1)
PROT SERPL-MCNC: 6.8 G/DL (ref 6.4–8.2)
RBC # BLD AUTO: 4.16 M/UL (ref 3.8–5.2)
SERVICE CMNT-IMP: ABNORMAL
SERVICE CMNT-IMP: ABNORMAL
SERVICE CMNT-IMP: NORMAL
SODIUM SERPL-SCNC: 141 MMOL/L (ref 136–145)
WBC # BLD AUTO: 8.5 K/UL (ref 3.6–11)

## 2019-04-25 PROCEDURE — 74011000258 HC RX REV CODE- 258: Performed by: INTERNAL MEDICINE

## 2019-04-25 PROCEDURE — 74011250637 HC RX REV CODE- 250/637: Performed by: PSYCHIATRY & NEUROLOGY

## 2019-04-25 PROCEDURE — 85025 COMPLETE CBC W/AUTO DIFF WBC: CPT

## 2019-04-25 PROCEDURE — 36415 COLL VENOUS BLD VENIPUNCTURE: CPT

## 2019-04-25 PROCEDURE — 82962 GLUCOSE BLOOD TEST: CPT

## 2019-04-25 PROCEDURE — 65660000000 HC RM CCU STEPDOWN

## 2019-04-25 PROCEDURE — 80053 COMPREHEN METABOLIC PANEL: CPT

## 2019-04-25 PROCEDURE — 74011250636 HC RX REV CODE- 250/636: Performed by: INTERNAL MEDICINE

## 2019-04-25 RX ADMIN — QUETIAPINE FUMARATE 200 MG: 100 TABLET ORAL at 19:09

## 2019-04-25 RX ADMIN — Medication 10 ML: at 06:00

## 2019-04-25 RX ADMIN — Medication 10 ML: at 21:32

## 2019-04-25 RX ADMIN — DEXTROSE MONOHYDRATE AND SODIUM CHLORIDE 75 ML/HR: 5; .45 INJECTION, SOLUTION INTRAVENOUS at 01:00

## 2019-04-25 RX ADMIN — Medication 10 ML: at 03:51

## 2019-04-25 RX ADMIN — HEPARIN SODIUM 5000 UNITS: 5000 INJECTION INTRAVENOUS; SUBCUTANEOUS at 03:50

## 2019-04-25 RX ADMIN — HEPARIN SODIUM 5000 UNITS: 5000 INJECTION INTRAVENOUS; SUBCUTANEOUS at 13:11

## 2019-04-25 RX ADMIN — HEPARIN SODIUM 5000 UNITS: 5000 INJECTION INTRAVENOUS; SUBCUTANEOUS at 21:32

## 2019-04-25 RX ADMIN — Medication 10 ML: at 10:49

## 2019-04-25 RX ADMIN — Medication 10 ML: at 13:14

## 2019-04-25 NOTE — PROGRESS NOTES
Bedside shift change report given to Julisa Medina RN (oncoming nurse) by Christi Terrazas RN (offgoing nurse). Report included the following information SBAR, MAR and Cardiac Rhythm NSR. Zone Phone:   [phone] Significant changes during shift:  Very alert ,regular diet 1:1 sitter Patient Information Landon Degroot 42 y.o. 
4/23/2019  9:54 AM by Salvador Roberts MD. Landon Degroot was admitted from Home 
 
Problem List 
 
Patient Active Problem List  
 Diagnosis Date Noted  Drug overdose 04/23/2019  Altered mental status 04/23/2019 No past medical history on file. Core Measures: CVA: No No 
CHF:No No 
PNA:No No 
 
Activity Status: Up with 1 assist  
 
Supplemental O2: (If Applicable) N/A 
 
LINES AND DRAINS: 
PIV 
 
 
DVT prophylaxis: 
 
Heparin Wounds: (If Applicable) N/A skin intact Patient Safety: 
 
Falls Score Total Score: 3 Safety Level_______ Bed Alarm On? Yes Sitter? Yes 
 
Plan for upcoming shift: seen by psychiatry today, 1:1 sitter Discharge Plan: Yes, ongoing Active Consults: 
IP CONSULT TO PSYCHIATRY

## 2019-04-25 NOTE — PROGRESS NOTES
Bedside shift change report given to Laura BERRY (oncoming nurse) by Terrell Marshall (offgoing nurse). Report included the following information SBAR, MAR and Cardiac Rhythm NSR. Zone Phone:   [phone] Significant changes during shift:  None Patient Information Rowan Carrera 42 y.o. 
4/23/2019  9:54 AM by Luther Justice MD. Rowan Carrera was admitted from Home 
 
Problem List 
 
Patient Active Problem List  
 Diagnosis Date Noted  Drug overdose 04/23/2019  Altered mental status 04/23/2019 No past medical history on file. Core Measures: CVA: No No 
CHF:No No 
PNA:No No 
 
Activity Status: Up with 1 assist  
 
Supplemental O2: (If Applicable) N/A 
 
LINES AND DRAINS: 
PIV 
 
 
DVT prophylaxis: 
 
Heparin Wounds: (If Applicable) N/A skin intact Patient Safety: 
 
Falls Score Total Score: 2 Safety Level_______ Bed Alarm On? Yes Sitter? Yes 
 
Plan for upcoming shift: 1:1 sitter Discharge Plan: Yes, ongoing - IP mental health Active Consults: 
IP CONSULT TO PSYCHIATRY

## 2019-04-25 NOTE — PROGRESS NOTES
Hospitalist Progress Note NAME: Lucio Leblanc :  1980 MRN:  [id number] Assessment / Plan: 
Acute metabolic encephalopathy  Seroquel overdose versus  Polysubstance abuse (Cocaine and THC in urine ) resolved Per EMS , pt took 18tabs of seroquel  And UDS + cocaine and THC Ct scan brain negative B12 folate, ammonia are normal 
UA is normal 
TSH is low EKG shows normal interval 
Poison control recommended telemetry monitoring and seizure monitoring She reports that she was not suicidal 
Psych recommended placement in in pt psych facility and waiting on placement Cont  diet Acute kidney injury, pre-renal  
-cr is now 1.20 and improving 
-cont iv fluids and repeat in am  
  
H/o DM2 Sliding scale for now and check poc's Bipolar disease Restarted home seroquel by psych Low TSH concern for hyperthyroidism 
-t4 level pending 
-will need out pt follow up Dispo: pending placement, medically clear for discharge. 
  
  
NOK: Mother 
  
 
  
Code Status: Full Code DVT Prophylaxis: Hep SQ 
GI Prophylaxis: not indicated Body mass index is 37.05 kg/m². Subjective: Chief Complaint / Reason for Physician Visit She is back to her base line today No new symptoms Wants to go to in pt psych Review of Systems: 
Symptom Y/N Comments  Symptom Y/N Comments Fever/Chills    Chest Pain Poor Appetite    Edema Cough    Abdominal Pain Sputum    Joint Pain SOB/BERNARD    Pruritis/Rash Nausea/vomit    Tolerating PT/OT Diarrhea    Tolerating Diet Constipation    Other Could NOT obtain due to:   
 
Objective: VITALS:  
Last 24hrs VS reviewed since prior progress note. Most recent are: 
Patient Vitals for the past 24 hrs: 
 Temp Pulse Resp BP SpO2  
19 1543 98.5 °F (36.9 °C) 64 18 115/54 99 % 19 1315 98.5 °F (36.9 °C) 80 18 115/64 97 % 19 1211    107/61   
19 1134 98.3 °F (36.8 °C) 68 18 99/61 96 % 04/25/19 0740 98.7 °F (37.1 °C) 84 20 109/71 100 % 04/25/19 0352 98.3 °F (36.8 °C) 73 18 105/62 97 % 04/24/19 2358 97.9 °F (36.6 °C) 97 18 116/70 99 % 04/24/19 1929 98.2 °F (36.8 °C) 86 18 118/71 98 % Intake/Output Summary (Last 24 hours) at 4/25/2019 1612 Last data filed at 4/24/2019 2061 Gross per 24 hour Intake 240 ml Output 200 ml Net 40 ml PHYSICAL EXAM: 
General: cooperative, no acute distress   
EENT:  EOMI. Anicteric sclerae. MMM Resp:  CTA bilaterally, no wheezing or rales. No accessory muscle use CV:  Regular  rhythm,  No edema GI:  Soft, Non distended, Non tender.  +Bowel sounds Neurologic:  Alert and awake, normal speech, Psych:   Not anxious nor agitated Skin:  No rashes. No jaundice Reviewed most current lab test results and cultures  YES Reviewed most current radiology test results   YES Review and summation of old records today    NO Reviewed patient's current orders and MAR    YES 
PMH/SH reviewed - no change compared to H&P Current Facility-Administered Medications:  
  hydrOXYzine HCl (ATARAX) tablet 50 mg, 50 mg, Oral, TID PRN, Ju Mckinney MD, 50 mg at 04/24/19 2147   QUEtiapine (SEROquel) tablet 200 mg, 200 mg, Oral, QPM, Ju Mckinney MD, 200 mg at 04/24/19 2128   sodium chloride (NS) flush 5-40 mL, 5-40 mL, IntraVENous, Q8H, Santa Blount MD, 10 mL at 04/25/19 1314   sodium chloride (NS) flush 5-40 mL, 5-40 mL, IntraVENous, PRN, Santa Blount MD, 10 mL at 04/25/19 2143   heparin (porcine) injection 5,000 Units, 5,000 Units, SubCUTAneous, Q8H, Santa Blount MD, 5,000 Units at 04/25/19 1311 
  insulin lispro (HUMALOG) injection, , SubCUTAneous, AC&HS, Santa Blount MD, Stopped at 04/23/19 1630 
  glucose chewable tablet 16 g, 4 Tab, Oral, PRN, Santa Blount MD 
  dextrose (D50) infusion 12.5-25 g, 12.5-25 g, IntraVENous, PRN, Santa Blount MD 
   glucagon (GLUCAGEN) injection 1 mg, 1 mg, IntraMUSCular, PRN, Christel Villa MD 
________________________________________________________________________ Care Plan discussed with: 
  Comments Patient y Family RN y   
Care Manager Consultant Multidiciplinary team rounds were held today with , nursing, pharmacist and clinical coordinator. Patient's plan of care was discussed; medications were reviewed and discharge planning was addressed. ________________________________________________________________________ Total NON critical care TIME:  25    Minutes Total CRITICAL CARE TIME Spent:   Minutes non procedure based Comments >50% of visit spent in counseling and coordination of care    
________________________________________________________________________ Ale Christiansen MD  
 
Procedures: see electronic medical records for all procedures/Xrays and details which were not copied into this note but were reviewed prior to creation of Plan. LABS: 
I reviewed today's most current labs and imaging studies. Pertinent labs include: 
Recent Labs  
  04/25/19 0300 04/24/19 0420 04/23/19 
1000 WBC 8.5 12.0* 11.3* HGB 11.8 12.2 13.3 HCT 37.6 38.0 41.9  381 376 Recent Labs  
  04/25/19 
0300 04/24/19 
0420 04/23/19 
1000  143 143  
K 3.5 3.8 4.7  110* 110* CO2 23 27 28 GLU 90 95 107* BUN 11 10 9 CREA 1.21* 1.20* 1.38* CA 8.7 8.9 9.5 ALB 3.2*  --  4.1 TBILI 0.2  --  0.2 SGOT 23  --  17 ALT 21  --  22 Signed: Ale Christiansen MD

## 2019-04-25 NOTE — PROGRESS NOTES
[facility] and gave her all information regarding inpatient psyche bed needed for patient. She took all the information and will review and get back with me. Patient has an application for the [facility] that she states she will complete.

## 2019-04-25 NOTE — PROGRESS NOTES
CM received consult for IP psych placement. CM entered room, identified self and asked pt permission to proceed with sitter at bedside. Pt agreeable. CM explained role, pt verbalized understanding. CM verified current Demographics on chart. CM added Cell Phone number given by patient. CM added patients mother Emigdio Lan as an emergency contact with phone number given by patient as requested by Ms. Sourav Schrader. CM repeated information with pt to verify accuracy. Reason for Admission:   Drug overdose RRAT Score:  6 Do you (patient/family) have any concerns for transition/discharge? Pt stated  \"I am going to [facility] for treatment\". Pt voiced no concerns regarding transfer when stable. Plan for utilizing home health:  None at this time Current Advanced Directive/Advance Care Plan:  Full Code, No ACP on Chart. Likelihood of readmission? High 
         
Transition of Care Plan:       
 
42year old female admitted for drug overdose, upon admission no identification was with pt. Patient was admitted as Oswald Muro. As of this note pts name and birth date are now entered in the chart. Pt states she lives with her mother Emigdio hCoi in a 2 story home, where her bedroom is on the 2nd floor. There are approx. 10 steps to the 2nd floor. Pt states she is independent of ADL's/IADL's and does not utilize any DME. Pt states her preferred RX in [address]. Pt states she has not followed with her PCP, Dr hemphill in a while. Pt declined assistance or information with obtaining a new PCP at this time. [place] Plan: 
 
1) Pt to transfer to [address] [place] [address] facility when medically stable. 2) CM to set up Transfer to [address] [place] [address] when pt medically stable. 2) Pt would benefit from establishing PCP if she is no longer following current PCP of record. 3) Pt would benefit from resources regarding substance abuse programs. CM to remain available for support and discharge planning. Care Management Interventions PCP Verified by CM: Yes(pt stated she has not seen PCP in a while) Mode of Transport at Discharge: Other (see comment)(pt stated she will transfer to [facility] for care) Transition of Care Consult (CM Consult): Other(tx to IP psych when medically stable) Discharge Durable Medical Equipment: No 
Physical Therapy Consult: No 
Occupational Therapy Consult: No 
Speech Therapy Consult: No 
Current Support Network: Other(lives with mother in 2 story home, bedroom is on 2nd floor. ) Confirm Follow Up Transport: Other (see comment)(unable to determine at this time. ) Plan discussed with Pt/Family/Caregiver: Yes Cheryle Rilee. RN, BSN [facility] [phone]
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
## 2019-04-25 NOTE — PROGRESS NOTES
Problem: Falls - Risk of 
Goal: *Absence of Falls Description Document Tia Manus Fall Risk and appropriate interventions in the flowsheet. Outcome: Progressing Towards Goal 
  
Problem: Patient Education: Go to Patient Education Activity Goal: Patient/Family Education Outcome: Progressing Towards Goal 
  
Problem: Pressure Injury - Risk of 
Goal: *Prevention of pressure injury Description Document Reynaldo Scale and appropriate interventions in the flowsheet. Outcome: Progressing Towards Goal 
  
Problem: Patient Education: Go to Patient Education Activity Goal: Patient/Family Education Outcome: Progressing Towards Goal 
  
Problem: Suicide/Homicide (Adult/Pediatric) Goal: *STG: Remains safe in hospital 
Outcome: Progressing Towards Goal 
Goal: *STG: Seeks staff when feelings of self harm or harm towards others arise Outcome: Progressing Towards Goal 
Goal: *STG: Attends activities and groups Outcome: Progressing Towards Goal 
Goal: *STG:  Verbalizes alternative ways of dealing with maladaptive feelings/behaviors Outcome: Progressing Towards Goal 
Goal: *STG/LTG: Complies with medication therapy Outcome: Progressing Towards Goal 
Goal: *STG/LTG:  No longer expresses self destructive or suicidal/homicidal thoughts Outcome: Progressing Towards Goal 
Goal: *LTG:  Identifies available community resources Outcome: Progressing Towards Goal 
Goal: *LTG:  Develops proactive suicide prevention plan Outcome: Progressing Towards Goal 
Goal: Interventions Outcome: Progressing Towards Goal

## 2019-04-26 ENCOUNTER — HOSPITAL ENCOUNTER (INPATIENT)
Age: 39
LOS: 4 days | Discharge: HOME | DRG: 885 | End: 2019-04-30
Attending: PSYCHIATRY & NEUROLOGY | Admitting: PSYCHIATRY & NEUROLOGY
Payer: SELF-PAY

## 2019-04-26 VITALS
RESPIRATION RATE: 18 BRPM | DIASTOLIC BLOOD PRESSURE: 64 MMHG | HEART RATE: 72 BPM | WEIGHT: 265.65 LBS | HEIGHT: 71 IN | SYSTOLIC BLOOD PRESSURE: 110 MMHG | OXYGEN SATURATION: 92 % | BODY MASS INDEX: 37.19 KG/M2 | TEMPERATURE: 98 F

## 2019-04-26 PROBLEM — F31.9 BIPOLAR DISORDER (HCC): Status: ACTIVE | Noted: 2019-04-26

## 2019-04-26 LAB
GLUCOSE BLD STRIP.AUTO-MCNC: 103 MG/DL (ref 65–100)
GLUCOSE BLD STRIP.AUTO-MCNC: 118 MG/DL (ref 65–100)
GLUCOSE BLD STRIP.AUTO-MCNC: 97 MG/DL (ref 65–100)
SERVICE CMNT-IMP: ABNORMAL
SERVICE CMNT-IMP: ABNORMAL
SERVICE CMNT-IMP: NORMAL

## 2019-04-26 PROCEDURE — 74011250636 HC RX REV CODE- 250/636: Performed by: INTERNAL MEDICINE

## 2019-04-26 PROCEDURE — 82962 GLUCOSE BLOOD TEST: CPT

## 2019-04-26 PROCEDURE — 65220000003 HC RM SEMIPRIVATE PSYCH

## 2019-04-26 PROCEDURE — 74011250637 HC RX REV CODE- 250/637: Performed by: PSYCHIATRY & NEUROLOGY

## 2019-04-26 RX ORDER — BENZTROPINE MESYLATE 1 MG/ML
2 INJECTION INTRAMUSCULAR; INTRAVENOUS
Status: DISCONTINUED | OUTPATIENT
Start: 2019-04-26 | End: 2019-04-30 | Stop reason: HOSPADM

## 2019-04-26 RX ORDER — QUETIAPINE FUMARATE 100 MG/1
200 TABLET, FILM COATED ORAL
Status: DISCONTINUED | OUTPATIENT
Start: 2019-04-26 | End: 2019-04-30 | Stop reason: HOSPADM

## 2019-04-26 RX ORDER — ACETAMINOPHEN 325 MG/1
650 TABLET ORAL
Status: DISCONTINUED | OUTPATIENT
Start: 2019-04-26 | End: 2019-04-30 | Stop reason: HOSPADM

## 2019-04-26 RX ORDER — BENZTROPINE MESYLATE 2 MG/1
2 TABLET ORAL
Status: DISCONTINUED | OUTPATIENT
Start: 2019-04-26 | End: 2019-04-30 | Stop reason: HOSPADM

## 2019-04-26 RX ORDER — IBUPROFEN 400 MG/1
400 TABLET ORAL
Status: DISCONTINUED | OUTPATIENT
Start: 2019-04-26 | End: 2019-04-30 | Stop reason: HOSPADM

## 2019-04-26 RX ORDER — IBUPROFEN 200 MG
1 TABLET ORAL
Status: DISCONTINUED | OUTPATIENT
Start: 2019-04-26 | End: 2019-04-30 | Stop reason: HOSPADM

## 2019-04-26 RX ORDER — METRONIDAZOLE 500 MG/1
500 TABLET ORAL 2 TIMES DAILY
Qty: 12 TAB | Refills: 0 | Status: SHIPPED | OUTPATIENT
Start: 2019-04-26 | End: 2019-04-30

## 2019-04-26 RX ORDER — OLANZAPINE 5 MG/1
5 TABLET ORAL
Status: DISCONTINUED | OUTPATIENT
Start: 2019-04-26 | End: 2019-04-28

## 2019-04-26 RX ORDER — ADHESIVE BANDAGE
30 BANDAGE TOPICAL DAILY PRN
Status: DISCONTINUED | OUTPATIENT
Start: 2019-04-26 | End: 2019-04-30 | Stop reason: HOSPADM

## 2019-04-26 RX ADMIN — Medication 10 ML: at 13:34

## 2019-04-26 RX ADMIN — HEPARIN SODIUM 5000 UNITS: 5000 INJECTION INTRAVENOUS; SUBCUTANEOUS at 05:44

## 2019-04-26 RX ADMIN — Medication 10 ML: at 05:45

## 2019-04-26 RX ADMIN — QUETIAPINE FUMARATE 200 MG: 100 TABLET ORAL at 21:13

## 2019-04-26 RX ADMIN — HEPARIN SODIUM 5000 UNITS: 5000 INJECTION INTRAVENOUS; SUBCUTANEOUS at 13:34

## 2019-04-26 NOTE — PROGRESS NOTES
Documented on 4/26/19 Spiritual Care Partner Volunteer visited patient in Neuro Tele on 4/25/2019. Documented by: 
Johanna Christiansen MDiv Pager: [phone]

## 2019-04-26 NOTE — DISCHARGE SUMMARY
Hospitalist Discharge Summary     Patient ID:  Charity Quevedo  [id number]  41 y.o.  1980    PCP on record: Munira Trujillo MD    Admit date: 4/23/2019  Discharge date and time: 4/26/2019      DISCHARGE DIAGNOSIS:    Acute metabolic encephalopathy  Seroquel overdose versus  Polysubstance abuse (Cocaine and THC in urine) resolved  Acute kidney injury, pre-renal   H/o DM2  Bipolar disease   Low TSH concern for hyperthyroidism        CONSULTATIONS:  IP CONSULT TO PSYCHIATRY    Excerpted HPI from H&P of Francisco Conley MD:  Verónica Sanford is a 42 y.o.  female with known history as listed below presents to ED with complaint noted above. Available records were reviewed at the time of H&P. This is a 53-year-old female with past medical history of bipolar disease, diabetes was found semi conscious sitting in a chair  at her home by the EMS, family at the scene reported that patient vomited in the bathroom and was found in her current condition when they came in the room and they suspect that she took approximately 25 Seroquel that she was prescribed.  Per EMS note, family reported that patient  Does not  have a history of drug abuse or suicide . When seen, no family at bedside and no contact information on file. Unclear baseline mental status   Review of her labs showed that her UDS was positive for cocaine and cannabinol .  Her labs revealed acute kidney injury  Her EKG showed a QTC of 440, Poison control was called by the ED physician, poison control  Advised 6 hours of observation and frequent QTC check  CT scan of the brain was negative for any acute pathology. Review of systems unable to be obtained as patient is still confused  We were asked to see for work up and evaluation of the above problems. ______________________________________________________________________  DISCHARGE SUMMARY/HOSPITAL COURSE:  for full details see H&P, daily progress notes, labs, consult notes. Acute metabolic encephalopathy  Seroquel overdose versus  Polysubstance abuse (Cocaine and THC in urine ) resolved  She was admitted to the hospital and underwent following evaluation and managment  Per EMS , pt took 18tabs of seroquel  And UDS + cocaine and THC  Ct scan brain negative   B12 folate, ammonia are normal  UA is normal  TSH is low  EKG shows normal interval  Poison control recommended telemetry monitoring and seizure monitoring  She reports that she was not suicidal  Psych recommended placement in in pt psych facility and waiting on placement  Cont  diet  She was back to her base line and     Acute kidney injury, pre-renal   -cr is now 1.20 and improved        H/o DM2  Sliding scale for now and check poc's  Blood sugars here have been normal     Bipolar disease   Restarted home seroquel by psych        Low TSH concern for hyperthyroidism  -t4  check on out pt basis and out pt f/u with endocrine  -t4 ordered as in pt but nursing did not send so could not be done while in pt    Hx of Recent BV  -started on flagyl but only 2 pills so will start for 6 more days to complete rx        _______________________________________________________________________  Patient seen and examined by me on discharge day. Pertinent Findings:  Gen:    Not in distress  Chest: Clear lungs  CVS:   Regular rhythm. No edema  Abd:  Soft, not distended, not tender  Neuro:  Alert, awake  _______________________________________________________________________  DISCHARGE MEDICATIONS:   Current Discharge Medication List      START taking these medications    Details   metroNIDAZOLE (FLAGYL) 500 mg tablet Take 1 Tab by mouth two (2) times a day for 6 days. Qty: 12 Tab, Refills: 0         CONTINUE these medications which have NOT CHANGED    Details   baclofen (LIORESAL) 20 mg tablet Take 20 mg by mouth daily as needed.  Patient prescribed 20 mg daily and an additional 20 mg daily PRN      QUEtiapine (SEROQUEL) 200 mg tablet Take 200 mg by mouth nightly. My Recommended Diet, Activity, Wound Care, and follow-up labs are listed in the patient's Discharge Insturctions which I have personally completed and reviewed. _______________________________________________________________________  DISPOSITION:    Home with Family:    Home with HH/PT/OT/RN:    SNF/LTC:    GARCIA:    OTHER: y       Condition at Discharge:  Stable  _______________________________________________________________________  Follow up with:   PCP : Olayinka Alejandre MD  Follow-up Information     Follow up With Specialties Details Why Contact Info    Read, Margie Swartz MD Family Practice Go on 5/6/2019 Please follow up on May 6, 2019 at 10:30 [address]      Natalie Amaya MD Endocrinology  Office will call to schedule follow up appointment as a new pt for hyperthyroidism.  [facility]  [address] [facility]--inpatient psyche   Patient will be going to inpatient psyche at [facility].  [phone]              Total time in minutes spent coordinating this discharge (includes going over instructions, follow-up, prescriptions, and preparing report for sign off to her PCP) :  35 minutes    Signed:  Viry Ruiz MD

## 2019-04-26 NOTE — PROGRESS NOTES
[facility] can transport patient at 1730pm today. Patient made aware and in agreement. Nursing aware. Nursing and md aware this is [facility] . Patient states she will call her mother and let her know of the transfer to [facility]. CM tried times 2 and unable to reach.

## 2019-04-26 NOTE — PROGRESS NOTES
Sitter at bedside. Bedside shift change report given to Faizan huertas (oncoming nurse) by Stefan Gutierrez (offgoing nurse). Report included the following information SBAR, Kardex, Procedure Summary, Intake/Output, MAR, Recent Results and Cardiac Rhythm NSR.

## 2019-04-26 NOTE — PROGRESS NOTES
Bedside shift change report given to Bing 25 (oncoming nurse) by Valeria De Oliveira RN (offgoing nurse). Report included the following information SBAR, MAR and Cardiac Rhythm NSR. Zone Phone:   [phone] Significant changes during shift:  None Patient Information Marylee Fraction 42 y.o. 
4/23/2019  9:54 AM by Juliana Avalos MD. Marylee Fraction was admitted from Home 
 
Problem List 
 
Patient Active Problem List  
 Diagnosis Date Noted  Drug overdose 04/23/2019  Altered mental status 04/23/2019 No past medical history on file. Core Measures: CVA: No No 
CHF:No No 
PNA:No No 
 
Activity Status: Up with 1 assist  
 
Supplemental O2: (If Applicable) N/A 
 
LINES AND DRAINS: 
PIV 
 
 
DVT prophylaxis: 
 
Heparin Wounds: (If Applicable) N/A skin intact Patient Safety: 
 
Falls Score Total Score: 2 Safety Level_______ Bed Alarm On? Yes Sitter? Yes 
 
Plan for upcoming shift: 1:1 sitter, fs ac and hs Discharge Plan: Yes, ongoing - IP mental health Active Consults: 
IP CONSULT TO PSYCHIATRY

## 2019-04-26 NOTE — PROGRESS NOTES
[facility] with [facility]. She will get back with me today she states she was told there will be some discharges however there are no orders as of yet.

## 2019-04-26 NOTE — DISCHARGE INSTRUCTIONS
Patient Discharge Instructions    Lesa Washington / [id number] : 1980    Admitted 2019 Discharged: 2019         DISCHARGE DIAGNOSIS:   Active Problems:    Drug overdose (2019)      Altered mental status (2019)     Acute metabolic encephalopathy due Seroquel overdose versus  Polysubstance abuse (Cocaine and THC in urine ) resolved  Acute kidney injury, pre-renal resolved  H/o DM2  Bipolar disease               Take Home Medications     {Medication reconciliation information is now added to the patient's AVS automatically when it is printed. There is no need to use this SmartLink in discharge instructions. Highlight this text and delete it to clear this message}      General drug facts     If you have a very bad allergy, wear an allergy ID at all times. It is important that you take the medication exactly as they are prescribed. Keep your medication in the bottles provided by the pharmacist.  Keep a list of all your drugs (prescription, natural products, vitamins, OTC) with you. Give this list to your doctor. Do not take other medications without consulting your doctor. Do not share your drugs with others and do not take anyone else's drugs. Keep all drugs out of the reach of children and pets. Most drugs may be thrown away in household trash after mixing with coffee grounds or mika litter and sealing in a plastic bag. Keep a list Call your doctor for help with any side effects. If in the [place] you may also call the FDA at 1-820-FDA-0742    Talk with the doctor before starting any new drug, including OTC, natural products, or vitamins. What to do at Home    1. Recommended diet: Regular    2. Recommended activity: Activity as tolerated    3. If you experience any of the following symptoms then please call your primary care physician or return to the emergency room if you cannot get hold of your doctor:    4. Wound Care: None    5.  Lab work: Free T4 level in 1 week    6. Bring these papers with you to your follow up appointments. The papers will help your doctors be sure to continue the care plan from the hospital.    7.Follow up with Dr Pauline Rodarte from endocrine for abnormal Thyroid hormones  in 1 weeks      Follow-up with:   PCP: Belén Humphries MD  Follow-up Information     Follow up With Specialties Details Why Contact Info    Read, Dennie Oh, MD Family Practice Schedule an appointment as soon as possible for a visit in 1 week  [address]      Elia Lamb MD Endocrinology Schedule an appointment as soon as possible for a visit in 1 week As new pt for hyperthyroidism. [address] [facility]  [phone]             Please call for your own appointment        Information obtained by :  I understand that if any problems occur once I am at home I am to contact my physician. I understand and acknowledge receipt of the instructions indicated above. [de-identified] or R.N.'s Signature                                                                  Date/Time                                                                                                                                              Patient or Representative Signature                                                          Date/Time              You will need your Thyroid hormone levels checked (Free T4) in 1 week.

## 2019-04-26 NOTE — PROGRESS NOTES
Dr Zaira Davila will be discharging patient to inpatient psyche at [facility] today. PCS completed with medicals and given to nursing. Referral sent to [facility] and will await their response.

## 2019-04-26 NOTE — PROGRESS NOTES
Discharge papers and medications given to ambulance staff, they left the medications on the nursing station, [facility] RN tube the medications to station 4 to give them to ambulance staff. Pt left the unit via ambulance , took all her belongings

## 2019-04-26 NOTE — PROGRESS NOTES
Received call from [facility] with [facility] and they have an inpatient psyche bed for her. Paged Dr Tomy Bardales to alert him of this. Informed patient and she is willing to go. This is an Emt[facility]. Nursing to call report to [phone]. She will be going to bed 727 bed one. The accepting physician is Dr Holly Saunders.

## 2019-04-27 LAB
EST. AVERAGE GLUCOSE BLD GHB EST-MCNC: 123 MG/DL
HBA1C MFR BLD: 5.9 % (ref 4.2–6.3)

## 2019-04-27 PROCEDURE — 74011250637 HC RX REV CODE- 250/637: Performed by: PSYCHIATRY & NEUROLOGY

## 2019-04-27 PROCEDURE — 83036 HEMOGLOBIN GLYCOSYLATED A1C: CPT

## 2019-04-27 PROCEDURE — 65220000003 HC RM SEMIPRIVATE PSYCH

## 2019-04-27 PROCEDURE — 74011250637 HC RX REV CODE- 250/637: Performed by: NURSE PRACTITIONER

## 2019-04-27 RX ORDER — HYDROXYZINE 50 MG/1
50 TABLET, FILM COATED ORAL
Status: DISCONTINUED | OUTPATIENT
Start: 2019-04-27 | End: 2019-04-28

## 2019-04-27 RX ORDER — VENLAFAXINE HYDROCHLORIDE 150 MG/1
300 CAPSULE, EXTENDED RELEASE ORAL
Status: DISCONTINUED | OUTPATIENT
Start: 2019-04-28 | End: 2019-04-30 | Stop reason: HOSPADM

## 2019-04-27 RX ORDER — FLUCONAZOLE 100 MG/1
150 TABLET ORAL
Status: COMPLETED | OUTPATIENT
Start: 2019-04-27 | End: 2019-04-27

## 2019-04-27 RX ADMIN — FLUCONAZOLE 150 MG: 100 TABLET ORAL at 13:24

## 2019-04-27 RX ADMIN — HYDROXYZINE HYDROCHLORIDE 50 MG: 50 TABLET, FILM COATED ORAL at 13:25

## 2019-04-27 RX ADMIN — QUETIAPINE FUMARATE 200 MG: 100 TABLET ORAL at 22:00

## 2019-04-27 RX ADMIN — OLANZAPINE 5 MG: 5 TABLET, FILM COATED ORAL at 16:18

## 2019-04-27 RX ADMIN — HYDROXYZINE HYDROCHLORIDE 50 MG: 50 TABLET, FILM COATED ORAL at 22:51
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
## 2019-04-27 NOTE — PROGRESS NOTES
Problem: Tobacco Use 
Goal: *Tobacco use abstinence Outcome: Progressing Towards Goal 
 Pt educated on tobacco cessation. Pt agreeable to nicotine patch while here in hospital. 
Problem: Falls - Risk of 
Goal: *Absence of Falls Description Document Cris Potts Fall Risk and appropriate interventions in the flowsheet. Outcome: Progressing Towards Goal 
Note: Pt free from falls this shift. Pt wearing non-skid safety socks. Problem: Depressed Mood (Adult/Pediatric) Goal: *STG: Participates in treatment plan Outcome: Progressing Towards Goal 
Note:  
Pt participating in treatment plan. Pt out on unit and participating in groups. Pt following unit routine. Pt defensive and guarded during treatment team. Pt stating that she has tried everything and nothing works. Pt agreeable to medication increase with Effexor. Pt requesting medication for BV. NP agreed to place orders for BV. Goal: *STG: Complies with medication therapy Outcome: Progressing Towards Goal

## 2019-04-27 NOTE — BH NOTES
Dr. Ana Dunn called back and was updated on pt current situation and background. Order received for labs and MD stated he will wait to see what the HgbA1c is before sliding scale or medication. Will collect labs. Will continue to monitor pt.

## 2019-04-27 NOTE — BH NOTES
GROUP THERAPY PROGRESS NOTE Lucio Leblanc is participating in [employer]. Group time: 15 minutes Personal goal for participation: \"shower\" Goal orientation: personal 
 
Group therapy participation: active Therapeutic interventions reviewed and discussed:  
 
Impression of participation:

## 2019-04-27 NOTE — PROGRESS NOTES
Problem: Discharge Planning Goal: *Discharge to safe environment Outcome: Not Progressing Towards Goal 
Note: Pt will return to live with her parents Problem: Discharge Planning Goal: *Knowledge of medication management Note: Pt will take all meds as scheduled. Pt will be aware of the names and usage of all her medications Goal: *Knowledge of discharge instructions Note: Pt will engage Team to develop an effective d/c plan for recovery

## 2019-04-27 NOTE — PROGRESS NOTES
Problem: Depressed Mood (Adult/Pediatric) Goal: *STG: Remains safe in hospital 
Outcome: Progressing Towards Goal 
Note:  
Received pt during shift change expressing concerns about increased anxiety. Pt stating \" anxiety medicine atarax isn't helping! Can you call and ask  for buspar!\" staff will continue to monitor q 15 min checks.

## 2019-04-27 NOTE — PROGRESS NOTES
Problem: Falls - Risk of 
Goal: *Absence of Falls Description Document Evelynlady Brittney Fall Risk and appropriate interventions in the flowsheet. Outcome: Progressing Towards Goal 
Note:  
Fall Risk Interventions: 
Medication Interventions: Teach patient to arise slowly Received pt lying in bed with eyes closed, appears to be asleep. NAD. Respirations even and unlabored. Will continue to monitor q15 for safety.

## 2019-04-27 NOTE — BH NOTES
PRN Medication Documentation Specific patient behavior that led to need for PRN medication: complaining of anxiety does not know the reason why 
Staff interventions attempted prior to PRN being given: Deep breathing, education journaling offered PRN medication given: Atarax PO given Patient response/effectiveness of PRN medication: 1410: Pt is talking to peers in the DR. Verbalizes feeling less anxious. Medication effective.

## 2019-04-27 NOTE — H&P
History & Physical 
 
Primary Care Provider: Christiane Kevin MD 
Source of Information: Patient Chief complaint: Intentional drug overdose History of Presenting Illness:  
Sonny Bob is a 42 y.o. female who presents [facility] to [facility] for inpatient psych admission. She was admitted at [facility] 4/23- on Tuesday for altered mental status due to seroquel overdose. Patient states she took the drugs because she was anxious. She denied intentionally trying to kill herself. She denies homicidal ideations. She currently denies any new complaints. No fever, chills, chest pain, cough, congestion, recent illness, palpitations, or dysuria. Review of Systems: A 12 point review of system reviewed and this was negative apart from what was stated in the body of the history and physical. 
 
 
No past medical history on file. No past surgical history on file. Prior to Admission medications Medication Sig Start Date End Date Taking? Authorizing Provider  
baclofen (LIORESAL) 20 mg tablet Take 20 mg by mouth daily as needed. Patient prescribed 20 mg daily and an additional 20 mg daily PRN   Yes Provider, Historical  
QUEtiapine (SEROQUEL) 200 mg tablet Take 200 mg by mouth nightly. Yes Provider, Historical  
metroNIDAZOLE (FLAGYL) 500 mg tablet Take 1 Tab by mouth two (2) times a day for 6 days. 4/26/19 5/2/19  Colton Brady MD  
 
No Known Allergies No family history on file. SOCIAL HISTORY: 
Patient resides: Lives with her parents. Has 4 children but does not have custody of the children [facility] With family care Smoking history:  
None Former Chronic x Alcohol history:  
None Social x Chronic Ambulates:  
Independently x  
w/cane   
w/walker   
w/wc CODE STATUS: 
DNR Full x Other Objective:  
 
Physical Exam:  
 
Visit Vitals /76 (BP 1 Location: Right arm) Pulse 80  
 Temp 97.9 °F (36.6 °C) Resp 18 SpO2 98% O2 Device: Room air General:  Alert, cooperative, no distress, appears stated age. Head:  Normocephalic, without obvious abnormality, atraumatic. Eyes:  Conjunctivae/corneas clear. PERRL, EOMs intact. Nose: Nares normal. Septum midline. Mucosa normal. No drainage or sinus tenderness. Throat: Lips, mucosa, and tongue normal. Teeth and gums normal.  
Neck: Supple, symmetrical, trachea midline, no adenopathy, thyroid: no enlargement/tenderness/nodules, no carotid bruit and no JVD. Back:   Symmetric, no curvature. ROM normal. No CVA tenderness. Lungs:   Clear to auscultation bilaterally. Chest wall:  No tenderness or deformity. Heart:  Regular rate and rhythm, S1, S2 normal, no murmur, click, rub or gallop. Abdomen:   Soft, non-tender. Bowel sounds normal. No masses,  No organomegaly. Extremities: Extremities normal, atraumatic, no cyanosis or edema. Pulses: 2+ and symmetric all extremities. Skin: Skin color, texture, turgor normal. No rashes or lesions Neurologic: CNII-XII intact. Psychiatry: Anxious Data Review:  
 
Recent Days: 
Recent Labs  
  04/25/19 
0300 04/24/19 
0420 WBC 8.5 12.0*  
HGB 11.8 12.2 HCT 37.6 38.0  
 381 Recent Labs  
  04/25/19 
0300 04/24/19 
0420  143  
K 3.5 3.8  110* CO2 23 27 GLU 90 95 BUN 11 10 CREA 1.21* 1.20* CA 8.7 8.9 ALB 3.2*  --   
SGOT 23  --   
ALT 21  -- No results for input(s): PH, PCO2, PO2, HCO3, FIO2 in the last 72 hours. 24 Hour Results: 
Recent Results (from the past 24 hour(s)) GLUCOSE, POC Collection Time: 04/25/19  9:28 PM  
Result Value Ref Range Glucose (POC) 110 (H) 65 - 100 mg/dL Performed by Jenny Lott (PCT) GLUCOSE, POC Collection Time: 04/26/19  6:30 AM  
Result Value Ref Range Glucose (POC) 118 (H) 65 - 100 mg/dL Performed by Tomasz Gonsalez (PCT) GLUCOSE, POC  
 Collection Time: 04/26/19 11:48 AM  
Result Value Ref Range Glucose (POC) 97 65 - 100 mg/dL Performed by Becki Bamberger (PCT) GLUCOSE, POC Collection Time: 04/26/19  4:09 PM  
Result Value Ref Range Glucose (POC) 103 (H) 65 - 100 mg/dL Performed by Becki Bamberger (PCT) Imaging:  
 
Assessment:  
 
Active Problems: 
  Bipolar disorder ([facility] (4/26/2019) Plan: 1. Bipolar disease: On seroquel. Further management as per Psych 2. Intentional drug overdose: 3. Obesity 3. Dispo: Continue inpatient psych admission Signed By: Jn Pardo MD   
 April 26, 2019

## 2019-04-27 NOTE — BH NOTES
PRN Medication Documentation Specific patient behavior that led to need for PRN medication: c/o anxiety/agitation Staff interventions attempted prior to PRN being given:coping skills PRN medication given: zyprexa Patient response/effectiveness of PRN medication: tl aware

## 2019-04-27 NOTE — BH NOTES
Patient has hx of diabetes. Called hospitalist for team 2, Dr. oRhit Crook, for possible insulin orders and POC. Dr. Rohit Crook is being paged.

## 2019-04-27 NOTE — INTERDISCIPLINARY ROUNDS
Behavioral Health Interdisciplinary Rounds Patient Name: Tyshawn Sinha  Age: 42 y.o. Room/Bed:  746/02 Primary Diagnosis: <principal problem not specified> Admission Status: Voluntary Readmission within 30 days: no 
Power of  in place: no 
Patient requires a blocked bed: no          Reason for blocked bed: VTE Prophylaxis: Not indicated Mobility needs/Fall risk: no 
Flu Vaccine : no  
Nutritional Plan: no 
Consults:         
Labs/Testing due today?: no 
 
Sleep hours:  6.5 Participation in Care/Groups:  *new Admit* Medication Compliant?: Yes PRNS (last 24 hours): None Restraints (last 24 hours):  no 
  
CIWA (range last 24 hours): COWS (range last 24 hours): Alcohol screening (AUDIT) completed -   AUDIT Score: 4 If applicable, date SBIRT discussed in treatment team AND documented:  
AUDIT Screen Score: AUDIT Score: 4 Document Brief Intervention (corresponds directly with the 5 A's, Ask, Advise, Assess, Assist, and Arrange): At- Risk Patients (Score 7-15 for women; 8-15 for men) Discuss concern patient is drinking at unhealthy levels known to increase risk of alcohol-related health problems. Is Patient ready to commit to change? If No: 
? Encourage reflection ? Discuss short term and long term health risks of consuming alcohol ? Barriers to change ? Reaffirm willingness to help / Educational materials provided If Yes: 
? Set goal 
? Plan 
? Educational materials provided Harmful use or Dependence (Score 16 or greater) ? Discuss short term and long term health risks of consuming alcohol ? Recommendations ? Negotiate drinking goal 
? Recommend addiction specialist/center ? Arrange follow-up appointments. Tobacco - patient is a smoker: Have You Used Tobacco in the Past 30 Days: Yes Illegal Drugs use: Have You Used Any Illegal Substances Over the Past 12 Months: No(denies upon unit admission) 24 hour chart check complete: yes Patient goal(s) for today: 1st Meeting with tx team 
Treatment team focus/goals:  Complete psych social assessment & develop with pt a course of goals for tx Progress note LOS:  1  Expected LOS:  
 
Financial concerns/prescription coverage:   
Date of last family contact:      
Family requesting physician contact today:   
Discharge plan: Return home to live with her parents Guns in the home: Outpatient provider(s):  [address] ( [facility] ) [facility] Participating treatment team members: Stephany Solomon RN, Minnie BATES and Lissette Gaviria NP

## 2019-04-28 PROCEDURE — 74011250637 HC RX REV CODE- 250/637: Performed by: PSYCHIATRY & NEUROLOGY

## 2019-04-28 PROCEDURE — 65220000003 HC RM SEMIPRIVATE PSYCH

## 2019-04-28 PROCEDURE — 74011250637 HC RX REV CODE- 250/637: Performed by: NURSE PRACTITIONER

## 2019-04-28 RX ORDER — BUSPIRONE HYDROCHLORIDE 5 MG/1
5 TABLET ORAL 3 TIMES DAILY
Status: DISCONTINUED | OUTPATIENT
Start: 2019-04-28 | End: 2019-04-30 | Stop reason: HOSPADM

## 2019-04-28 RX ORDER — CHLORPROMAZINE HYDROCHLORIDE 25 MG/1
25 TABLET, FILM COATED ORAL
Status: DISCONTINUED | OUTPATIENT
Start: 2019-04-28 | End: 2019-04-30 | Stop reason: HOSPADM

## 2019-04-28 RX ORDER — HYDROXYZINE 50 MG/1
100 TABLET, FILM COATED ORAL
Status: DISCONTINUED | OUTPATIENT
Start: 2019-04-28 | End: 2019-04-29

## 2019-04-28 RX ADMIN — QUETIAPINE FUMARATE 200 MG: 100 TABLET ORAL at 21:22

## 2019-04-28 RX ADMIN — HYDROXYZINE HYDROCHLORIDE 50 MG: 50 TABLET, FILM COATED ORAL at 08:40

## 2019-04-28 RX ADMIN — BUSPIRONE HYDROCHLORIDE 5 MG: 5 TABLET ORAL at 16:32

## 2019-04-28 RX ADMIN — BUSPIRONE HYDROCHLORIDE 5 MG: 5 TABLET ORAL at 21:22

## 2019-04-28 RX ADMIN — CHLORPROMAZINE HYDROCHLORIDE 25 MG: 25 TABLET, SUGAR COATED ORAL at 16:35

## 2019-04-28 RX ADMIN — VENLAFAXINE HYDROCHLORIDE 300 MG: 150 CAPSULE, EXTENDED RELEASE ORAL at 08:40

## 2019-04-28 NOTE — BH NOTES
PRN Medication Documentation 
@0712 Specific patient behavior that led to need for PRN medication: Anxiety Staff interventions attempted prior to PRN being given: relaxation,coloring,discussed coping mechanism,and distraction PRN medication given: Atarax 50mg PO Patient response/effectiveness of PRN medication: Pt resting,pt reports anxiety has decreased.

## 2019-04-28 NOTE — BH NOTES
PRN Medication Documentation Specific patient behavior that led to need for PRN medication: c/o anxiety Staff interventions attempted prior to PRN being given: coping skills PRN medication given: atarax Patient response/effectiveness of PRN medication: chiquita aware

## 2019-04-28 NOTE — PROGRESS NOTES
Problem: Falls - Risk of 
Goal: *Absence of Falls Description Document Cheli Lugo Fall Risk and appropriate interventions in the flowsheet. Note:  
Fall Risk Interventions: 
  
Patient is absent of falls. Medication Interventions: Teach patient to arise slowly Problem: Depressed Mood (Adult/Pediatric) Goal: *STG: Participates in treatment plan Note:  
Patient participates in treatment plan. Patient has flat affect, cooperative. Medications discussed. Patient has been out on the unit, engaged with peers, med and meal compliant.

## 2019-04-28 NOTE — BH NOTES
PRN Medication Documentation Specific patient behavior that led to need for PRN medication:c/o anxiety Staff interventions attempted prior to PRN being given: coping skills PRN medication given: logan Patient response/effectiveness of PRN medication: chiquita samayoa

## 2019-04-28 NOTE — PROGRESS NOTES
PSYCHIATRIC PROGRESS NOTE Chief Complaint: \"I am still anxious. \" Interval History: 
Kate Bob reports worsening anxiety. She has a long list of medications that tried previously. States she has tried xanax, ativan, klonopin, valium, ssri's, and some antipsychotic meds such as zyprexa. Notes that among the medications she tried for anxiety, xanax was the best one. Has not tried seroquel. Denies si/hi/avh. Attending groups. Becoming discharge focused. Past Medical History: No past medical history on file. ALLERGIES:(reviewed/updated 4/28/2019) No Known Allergies Laboratory report: 
Lab Results Component Value Date/Time WBC 8.5 04/25/2019 03:00 AM  
 HGB 11.8 04/25/2019 03:00 AM  
 HCT 37.6 04/25/2019 03:00 AM  
 PLATELET 421 91/09/2057 03:00 AM  
 MCV 90.4 04/25/2019 03:00 AM  
  
Lab Results Component Value Date/Time Sodium 141 04/25/2019 03:00 AM  
 Potassium 3.5 04/25/2019 03:00 AM  
 Chloride 107 04/25/2019 03:00 AM  
 CO2 23 04/25/2019 03:00 AM  
 Anion gap 11 04/25/2019 03:00 AM  
 Glucose 90 04/25/2019 03:00 AM  
 BUN 11 04/25/2019 03:00 AM  
 Creatinine 1.21 (H) 04/25/2019 03:00 AM  
 BUN/Creatinine ratio 9 (L) 04/25/2019 03:00 AM  
 GFR est AA >60 04/25/2019 03:00 AM  
 GFR est non-AA 50 (L) 04/25/2019 03:00 AM  
 Calcium 8.7 04/25/2019 03:00 AM  
 Bilirubin, total 0.2 04/25/2019 03:00 AM  
 AST (SGOT) 23 04/25/2019 03:00 AM  
 Alk. phosphatase 69 04/25/2019 03:00 AM  
 Protein, total 6.8 04/25/2019 03:00 AM  
 Albumin 3.2 (L) 04/25/2019 03:00 AM  
 Globulin 3.6 04/25/2019 03:00 AM  
 A-G Ratio 0.9 (L) 04/25/2019 03:00 AM  
 ALT (SGPT) 21 04/25/2019 03:00 AM  
  
Vitals:  
 04/28/19 0905 04/28/19 0907 04/28/19 0939 04/28/19 1007 BP: 99/69   (!) 89/66 Pulse: (!) 102   99 Resp: 18   16 Temp: 97.5 °F (36.4 °C)   98.2 °F (36.8 °C) SpO2: 99%   98% Weight:  119.3 kg (263 lb) 119.3 kg (263 lb) No results found for: VALF2, VALAC, VALP, VALPR, DS6, CRBAM, CRBAMP, CARB2, XCRBAM 
No results found for: [facility] EVALUATION [place] Vital Signs Patient Vitals for the past 24 hrs: 
 Temp Pulse Resp BP SpO2  
04/28/19 1007 98.2 °F (36.8 °C) 99 16 (!) 89/66 98 % 04/28/19 0905 97.5 °F (36.4 °C) (!) 102 18 99/69 99 % 04/27/19 2311 98.8 °F (37.1 °C) 85 16 90/60 95 % 04/27/19 1600 98.9 °F (37.2 °C) 88 16 (!) 86/51 97 % Wt Readings from Last 3 Encounters:  
04/28/19 119.3 kg (263 lb) 04/23/19 120.5 kg (265 lb 10.5 oz) Temp Readings from Last 3 Encounters:  
04/28/19 98.2 °F (36.8 °C)  
04/26/19 98 °F (36.7 °C) BP Readings from Last 3 Encounters:  
04/28/19 (!) 89/66  
04/26/19 110/64 Pulse Readings from Last 3 Encounters:  
04/28/19 99  
04/26/19 72 Radiology (reviewed/updated 4/28/2019) Ct Head Wo Cont Result Date: 4/23/2019 EXAM: CT HEAD WO CONT INDICATION: Drug overdose, confusion COMPARISON: None. CONTRAST: None. TECHNIQUE: Unenhanced CT of the head was performed using 5 mm images. Brain and bone windows were generated. CT dose reduction was achieved through use of a standardized protocol tailored for this examination and automatic exposure control for dose modulation. FINDINGS: The ventricles and sulci are normal in size, shape and configuration and midline. There is no significant white matter disease. There is no intracranial hemorrhage, extra-axial collection, mass, mass effect or midline shift. The basilar cisterns are open. No acute infarct is identified. The bone windows demonstrate no abnormalities. The visualized portions of the paranasal sinuses and mastoid air cells are clear. IMPRESSION: No acute abnormality. Side Effects: (reviewed/updated 4/28/2019) None reported or admitted to. Review of Systems: (reviewed/updated 4/28/2019) Appetite: 
Sleep: All other Review of Systems: MENTAL STATUS EXAM: 
 
General appearance:   Rowan Carrera is a 42 y.o. UNKNOWN female who is well groomed, psychomotor activity is WNL Eye contact: makes good eye contact Speech: Spontaneous and coherent Affect : full Mood: \"anxious\" Thought Process: Logical, goal directed Perception: Denies any AH or VH. Thought Content: Denies any SI or Plan Insight: Partial 
Judgement: Fair Cognition: Intact grossly. Physical Exam: Musculoskeletal system: Steady gait Tremor not present Cog wheeling not present Assessment and Plan: 
Lyla Zimmer meets criteria for a diagnoses of : 
1. Unspecified mood disorder. 2.  Rule out major depressive disorder. 3.  Generalized anxiety disorder. 4.  Substance abuse history. Add buspar per her request. 
Continue current medication regimen as prescribed Disposition planning to continue. I certify that this patients inpatient psychiatric hospital services furnished since the previous certification were, and continue to be, required for treatment that could reasonably be expected to improve the patient's condition, or for diagnostic study, and that the patient continues to need, on a daily basis, active treatment furnished directly by or requiring the supervision of inpatient psychiatric facility personnel. In addition, the hospital records show that services furnished were intensive treatment services, admission or related services, or equivalent services. Signed: 
Sirena Rea NP 
4/28/2019

## 2019-04-28 NOTE — PROGRESS NOTES
Problem: Falls - Risk of 
Goal: *Absence of Falls Description Document Jacquesquirino King Fall Risk and appropriate interventions in the flowsheet. Outcome: Progressing Towards Goal 
Note:  
Fall Risk Interventions: 
 Medication Interventions: Teach patient to arise slowly Received pt asleep in bed. NAD. Respirations even and unlabored. Will continue to monitor q15 for safety.

## 2019-04-28 NOTE — PROGRESS NOTES
Problem: Depressed Mood (Adult/Pediatric) Goal: *STG: Remains safe in hospital 
Outcome: Progressing Towards Goal 
Note:  
Pt out in milieu with peers watching tv with peers. No acute distress noted at this time. Staff will continue to monitor q 15 min checks.

## 2019-04-28 NOTE — INTERDISCIPLINARY ROUNDS
Behavioral Health Interdisciplinary Rounds Patient Name: Tyshawn Sinha  Age: 42 y.o. Room/Bed:  746/02 Primary Diagnosis: <principal problem not specified> Admission Status: Voluntary Readmission within 30 days: no 
Power of  in place: no 
Patient requires a blocked bed: no          Reason for blocked bed: VTE Prophylaxis: No 
 
Mobility needs/Fall risk: no 
Flu Vaccine : no  
Nutritional Plan: no 
Consults:         
Labs/Testing due today?: no 
 
Sleep hours: 6 Participation in Care/Groups:  yes Medication Compliant?: Yes PRNS (last 24 hours): Antipsychotic (PO) and Antianxiety Restraints (last 24 hours):  no 
  
CIWA (range last 24 hours): COWS (range last 24 hours): Alcohol screening (AUDIT) completed -   AUDIT Score: 4 If applicable, date SBIRT discussed in treatment team AND documented:  
AUDIT Screen Score: AUDIT Score: 4 Document Brief Intervention (corresponds directly with the 5 A's, Ask, Advise, Assess, Assist, and Arrange): At- Risk Patients (Score 7-15 for women; 8-15 for men) Discuss concern patient is drinking at unhealthy levels known to increase risk of alcohol-related health problems. Is Patient ready to commit to change? If No: 
? Encourage reflection ? Discuss short term and long term health risks of consuming alcohol ? Barriers to change ? Reaffirm willingness to help / Educational materials provided If Yes: 
? Set goal 
? Plan 
? Educational materials provided Harmful use or Dependence (Score 16 or greater) ? Discuss short term and long term health risks of consuming alcohol ? Recommendations ? Negotiate drinking goal 
? Recommend addiction specialist/center ? Arrange follow-up appointments. Tobacco - patient is a smoker: Have You Used Tobacco in the Past 30 Days: Yes Illegal Drugs use: Have You Used Any Illegal Substances Over the Past 12 Months: No(denies upon unit admission) 24 hour chart check complete: yes Patient goal(s) for today:  
Treatment team focus/goals:  
Progress note LOS:  2  Expected LOS:  
 
Financial concerns/prescription coverage:   
Date of last family contact:      
Family requesting physician contact today:   
Discharge plan:  
Guns in the home: Outpatient provider(s):  
 
Participating treatment team members: Rowan Carrera, * (assigned SW),
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
## 2019-04-29 PROCEDURE — 65220000003 HC RM SEMIPRIVATE PSYCH

## 2019-04-29 PROCEDURE — 74011250637 HC RX REV CODE- 250/637: Performed by: PSYCHIATRY & NEUROLOGY

## 2019-04-29 PROCEDURE — 74011250637 HC RX REV CODE- 250/637: Performed by: NURSE PRACTITIONER

## 2019-04-29 RX ADMIN — QUETIAPINE FUMARATE 200 MG: 100 TABLET ORAL at 21:33

## 2019-04-29 RX ADMIN — CHLORPROMAZINE HYDROCHLORIDE 25 MG: 25 TABLET, SUGAR COATED ORAL at 15:43

## 2019-04-29 RX ADMIN — BUSPIRONE HYDROCHLORIDE 5 MG: 5 TABLET ORAL at 21:33

## 2019-04-29 RX ADMIN — BUSPIRONE HYDROCHLORIDE 5 MG: 5 TABLET ORAL at 15:40

## 2019-04-29 RX ADMIN — BUSPIRONE HYDROCHLORIDE 5 MG: 5 TABLET ORAL at 09:33

## 2019-04-29 RX ADMIN — HYDROXYZINE HYDROCHLORIDE 100 MG: 50 TABLET, FILM COATED ORAL at 09:36

## 2019-04-29 RX ADMIN — VENLAFAXINE HYDROCHLORIDE 300 MG: 150 CAPSULE, EXTENDED RELEASE ORAL at 09:33
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
## 2019-04-29 NOTE — BH NOTES
GROUP THERAPY PROGRESS NOTE Susy Crockett is participating in Substance abuse group. Group time: 1 hour Personal goal for participation: To understand addiction, criteria for diagnosis, and identify triggers and coping skills. Goal orientation: personal 
 
Group therapy participation: minimal 
 
Therapeutic interventions reviewed and discussed: Group discussion of substance use, abuse, and dependence and the DSM 5 criteria for a substance use disorder. Patients were able to self-rate themselves based on the 11 criteria for a substance use disorder and explore their own level of addiction for cigarettes, alcohol, heroin, and other substances. Group discussed how they feel when they are unable to use and ways substance use has hindered their lives. Triggers for use and coping skills to avoid use or manage symptoms until craving subsides were discussed. Impression of participation: Chel Mejia was alert and oriented during group but chose to participate minimally. She was active in the discussion of social vs abuse vs dependent use of substances sharing her thoughts on each type. During discussion of diagnostic criteria and treatment options she was quiet but tracked the conversation with her eyes and appeared to listen attentively to others who shared their experiences.  
 
Mindy Mendes [facility]

## 2019-04-29 NOTE — PROGRESS NOTES
Problem: Depressed Mood (Adult/Pediatric) Goal: *STG: Complies with medication therapy Outcome: Progressing Towards Goal 
Note: Out in milieu for short period with some complaints of anxiety. Meal and medication compliant. Staff will continue to monitor q 15 min checks.

## 2019-04-29 NOTE — BH NOTES
PRN Medication Documentation 
@ [id number] Specific patient behavior that led to need for PRN medication: anxiety Staff interventions attempted prior to PRN being given: therapeutic listening, distraction PRN medication given: Atarax 100 mg PO Patient response/effectiveness of PRN medication: pending

## 2019-04-29 NOTE — BH NOTES
GROUP THERAPY PROGRESS NOTE Sonny Bob participated in the General Unit's Process Group, with a focus on setting a direction or goal, identifying feelings, and converting negative to positive thinking (CBT). Group time: 45 minutes. Personal goal for participation: To increase the capacity to improve ones mood, structure, and planning. Goal orientation: The patient will be able to identify a direction or goal for themselves, identify their feelings, develop a plan for structuring their day, and recognize CBT suggestions for changing ones thinking from negative to positive. Group therapy participation: With prompting, this patient actively participated in the group. Therapeutic interventions reviewed and discussed: The group members were asked to introduce themselves to each other, see if they could identify an emotion they are having, and/or let the group know what they want to focus on for their day. They were also provided a worksheet on CBT suggestions to change negative to positive thinking. The suggestions, in summary, included Naming, Letting Go, and Basic or Core Belief.  Impression of participation: The patient said she was, \"Hopeful,\" about her progress in this hospitalization. She added that she wanted to speak with her attending psychiatrist and her treatment team about the possibility of continuing with \"only three of the four medications\" she is currently taking. She was encouraged to share her medication concerns with her treatment team when she met with them later today. The patient was alert, generally oriented, and involved in today's group. She expressed no current SI/HI and displayed no overt psychotic symptoms. Her affect was non-dysphoric and mildly euphoric. Her mood reflected her affect. This was the patient's first process group with the undersigned.

## 2019-04-29 NOTE — PROGRESS NOTES
Problem: Depressed Mood (Adult/Pediatric) Goal: *STG: Remains safe in hospital 
Outcome: Progressing Towards Goal 
 Lying quietly in bed with eyes closed , respirations even and unlabored , NAD noted Q!5 min safety monitoring continues

## 2019-04-29 NOTE — BH NOTES
PRN Medication Documentation Specific patient behavior that led to need for PRN medication: Pt requesting thorazine for anxiety. Pt states atarax does not work. Charge nurse notified. Staff interventions attempted prior to PRN being given: calm environment PRN medication given: Thorazine 25mg PO Patient response/effectiveness of PRN medication: will continue to monitor

## 2019-04-29 NOTE — PROGRESS NOTES
PSYCHIATRIC PROGRESS NOTE Chief Complaint: \"I am doing great. \" Interval History: 
Juan Francisco Thomas reports she is doing well. Denies si/hi/avh. Anxiety is getting better, but reports that atarax is not working for her and would like for it to be dc'd. Difficulty sleeping due to \"bedbugs. \" She has been out in the unit with no behavioral issues noted. Requesting for discharge tomorrow, since has an appt with Dr Mullen [facility] at [facility] [address]. Past Medical History: No past medical history on file. ALLERGIES:(reviewed/updated 4/29/2019) No Known Allergies Laboratory report: 
Lab Results Component Value Date/Time WBC 8.5 04/25/2019 03:00 AM  
 HGB 11.8 04/25/2019 03:00 AM  
 HCT 37.6 04/25/2019 03:00 AM  
 PLATELET 422 73/43/3717 03:00 AM  
 MCV 90.4 04/25/2019 03:00 AM  
  
Lab Results Component Value Date/Time Sodium 141 04/25/2019 03:00 AM  
 Potassium 3.5 04/25/2019 03:00 AM  
 Chloride 107 04/25/2019 03:00 AM  
 CO2 23 04/25/2019 03:00 AM  
 Anion gap 11 04/25/2019 03:00 AM  
 Glucose 90 04/25/2019 03:00 AM  
 BUN 11 04/25/2019 03:00 AM  
 Creatinine 1.21 (H) 04/25/2019 03:00 AM  
 BUN/Creatinine ratio 9 (L) 04/25/2019 03:00 AM  
 GFR est AA >60 04/25/2019 03:00 AM  
 GFR est non-AA 50 (L) 04/25/2019 03:00 AM  
 Calcium 8.7 04/25/2019 03:00 AM  
 Bilirubin, total 0.2 04/25/2019 03:00 AM  
 AST (SGOT) 23 04/25/2019 03:00 AM  
 Alk. phosphatase 69 04/25/2019 03:00 AM  
 Protein, total 6.8 04/25/2019 03:00 AM  
 Albumin 3.2 (L) 04/25/2019 03:00 AM  
 Globulin 3.6 04/25/2019 03:00 AM  
 A-G Ratio 0.9 (L) 04/25/2019 03:00 AM  
 ALT (SGPT) 21 04/25/2019 03:00 AM  
  
Vitals:  
 04/28/19 1615 04/28/19 2052 04/29/19 0751 04/29/19 1202 BP: 109/72 95/68 107/76 103/76 Pulse: 100 98 97 90 Resp: 16 16 16 18 Temp: 98.4 °F (36.9 °C) 98.3 °F (36.8 °C) 98.1 °F (36.7 °C) 98.5 °F (36.9 °C) SpO2: 98% 98% 96% 98% Weight:      
   
No results found for: VALF2, VALAC, VALP, VALPR, DS6, CRBAM, CRBAMP, CARB2, XCRBAM 
No results found for: [facility] EVALUATION [place] Vital Signs Patient Vitals for the past 24 hrs: 
 Temp Pulse Resp BP SpO2  
04/29/19 1202 98.5 °F (36.9 °C) 90 18 103/76 98 % 04/29/19 0751 98.1 °F (36.7 °C) 97 16 107/76 96 % 04/28/19 2052 98.3 °F (36.8 °C) 98 16 95/68 98 % 04/28/19 1615 98.4 °F (36.9 °C) 100 16 109/72 98 % Wt Readings from Last 3 Encounters:  
04/28/19 119.3 kg (263 lb) 04/23/19 120.5 kg (265 lb 10.5 oz) Temp Readings from Last 3 Encounters:  
04/29/19 98.5 °F (36.9 °C)  
04/26/19 98 °F (36.7 °C) BP Readings from Last 3 Encounters:  
04/29/19 103/76  
04/26/19 110/64 Pulse Readings from Last 3 Encounters:  
04/29/19 90  
04/26/19 72 Radiology (reviewed/updated 4/29/2019) Ct Head Wo Cont Result Date: 4/23/2019 EXAM: CT HEAD WO CONT INDICATION: Drug overdose, confusion COMPARISON: None. CONTRAST: None. TECHNIQUE: Unenhanced CT of the head was performed using 5 mm images. Brain and bone windows were generated. CT dose reduction was achieved through use of a standardized protocol tailored for this examination and automatic exposure control for dose modulation. FINDINGS: The ventricles and sulci are normal in size, shape and configuration and midline. There is no significant white matter disease. There is no intracranial hemorrhage, extra-axial collection, mass, mass effect or midline shift. The basilar cisterns are open. No acute infarct is identified. The bone windows demonstrate no abnormalities. The visualized portions of the paranasal sinuses and mastoid air cells are clear. IMPRESSION: No acute abnormality. Side Effects: (reviewed/updated 4/29/2019) None reported or admitted to. Review of Systems: (reviewed/updated 4/29/2019) Appetite: 
Sleep: All other Review of Systems: MENTAL STATUS EXAM: 
 
General appearance:   Lyla Zimmer is a 42 y.o. UNKNOWN female who is well groomed, psychomotor activity is WNL Eye contact: makes good eye contact Speech: Spontaneous and coherent Affect : full Mood: \"anxious\" Thought Process: Logical, goal directed Perception: Denies any AH or VH. Thought Content: Denies any SI or Plan Insight: Partial 
Judgement: Fair Cognition: Intact grossly. Physical Exam: Musculoskeletal system: Steady gait Tremor not present Cog wheeling not present Assessment and Plan: 
Lyla Zimmer meets criteria for diagnoses of : 
1. Unspecified mood disorder. 2.  Rule out major depressive disorder. 3.  Generalized anxiety disorder. 4.  Substance abuse history. Dc atarax, per her request. 
Continue rest of the medication regimen as prescribed. Plan for discharge in am if stable in the next 24 hours. I certify that this patients inpatient psychiatric hospital services furnished since the previous certification were, and continue to be, required for treatment that could reasonably be expected to improve the patient's condition, or for diagnostic study, and that the patient continues to need, on a daily basis, active treatment furnished directly by or requiring the supervision of inpatient psychiatric facility personnel. In addition, the hospital records show that services furnished were intensive treatment services, admission or related services, or equivalent services. Signed: 
Sirena Rea NP 
4/29/2019

## 2019-04-29 NOTE — BH NOTES
GROUP THERAPY PROGRESS NOTE The patient Bayron Benson a 42 y.o. female is participating in Relaxation Group. Group time: 30 minutes Personal goal for participation: Relax Goal orientation: personal 
 
Group therapy participation: active Therapeutic interventions reviewed and discussed:  Yes Impression of participation:  
 
Beatris León 4/28/2019  9:47 PM

## 2019-04-29 NOTE — INTERDISCIPLINARY ROUNDS
Behavioral Health Interdisciplinary Rounds Patient Name: Tyshawn Sinha  Age: 42 y.o. Room/Bed:  746/02 Primary Diagnosis: <principal problem not specified> Admission Status: Voluntary Readmission within 30 days: no 
Power of  in place: no 
Patient requires a blocked bed: no          Reason for blocked bed: VTE Prophylaxis: No 
 
Mobility needs/Fall risk: no 
Flu Vaccine : no  
Nutritional Plan: no 
Consults:       
Labs/Testing due today?: no 
 
Sleep hours:  7 Participation in Care/Groups:  yes Medication Compliant?: Yes PRNS (last 24 hours): Antipsychotic (PO) and Antianxiety Restraints (last 24 hours):  no 
  
CIWA (range last 24 hours): COWS (range last 24 hours): Alcohol screening (AUDIT) completed -   AUDIT Score: 4 If applicable, date SBIRT discussed in treatment team AND documented:  
AUDIT Screen Score: AUDIT Score: 4 Tobacco - patient is a smoker: Have You Used Tobacco in the Past 30 Days: Yes Illegal Drugs use: Have You Used Any Illegal Substances Over the Past 12 Months: No(denies upon unit admission) 24 hour chart check complete: yes Patient goal(s) for today: Prepare for discharge tomorrow Treatment team focus/goals: Schedule follow-up Progress note: Pt reports wanting to leave; states she is triggered by bed bugs LOS:  3  Expected LOS: 4 Financial concerns/prescription coverage: Uninsured Date of last family contact: None Family requesting physician contact today: No 
Discharge plan: Return home Guns in the home: No     
Outpatient provider(s): Dr. Ling Kim Participating treatment team members: VAMSI Marcano; Brook Wise NP; Kimberly Byrne RN; Lilian Medrano PharmD

## 2019-04-29 NOTE — PROGRESS NOTES
Problem: Depressed Mood (Adult/Pediatric) Goal: *STG: Participates in treatment plan Outcome: Progressing Towards Goal 
Note: Out on unit engaged and participating in groups and activities. Mood and affect brighter when engaged, reports anxiety is tolerable and able to function well during groups and w peers. Appears relaxed during tv time and social w peers by smiling and lounging on sofa. Does not report SI. Daily goal is to use phone for a phone interview this afternoon and further discuss d/c plans. Staff focus is on d/c planning

## 2019-04-29 NOTE — PROGRESS NOTES
Laboratory Monitoring for Antipsychotics: This patient is currently prescribed the following medication(s):  
Current Facility-Administered Medications Medication Dose Route Frequency  
 busPIRone (BUSPAR) tablet 5 mg  5 mg Oral TID  
 venlafaxine-SR (EFFEXOR-XR) capsule 300 mg  300 mg Oral DAILY WITH BREAKFAST QUEtiapine (SEROquel) tablet 200 mg  200 mg Oral QHS The following labs have been completed for monitoring of antipsychotics and/or mood stabilizers: 
 
Height, Weight, BMI Estimation Estimated body mass index is 36.68 kg/m² as calculated from the following: 
  Height as of 4/23/19: 180.3 cm (71\"). Weight as of this encounter: 119.3 kg (263 lb). Vital Signs/Blood Pressure Visit Vitals /76 (BP Patient Position: Sitting) Pulse 90 Temp 98.5 °F (36.9 °C) Resp 18 Wt 119.3 kg (263 lb) SpO2 98% BMI 36.68 kg/m² Renal Function, Hepatic Function and Chemistry Estimated Creatinine Clearance: 90.6 mL/min (A) (based on SCr of 1.21 mg/dL (H)). Lab Results Component Value Date/Time Sodium 141 04/25/2019 03:00 AM  
 Potassium 3.5 04/25/2019 03:00 AM  
 Chloride 107 04/25/2019 03:00 AM  
 CO2 23 04/25/2019 03:00 AM  
 Anion gap 11 04/25/2019 03:00 AM  
 BUN 11 04/25/2019 03:00 AM  
 Creatinine 1.21 (H) 04/25/2019 03:00 AM  
 BUN/Creatinine ratio 9 (L) 04/25/2019 03:00 AM  
 Bilirubin, total 0.2 04/25/2019 03:00 AM  
 Protein, total 6.8 04/25/2019 03:00 AM  
 Albumin 3.2 (L) 04/25/2019 03:00 AM  
 Globulin 3.6 04/25/2019 03:00 AM  
 A-G Ratio 0.9 (L) 04/25/2019 03:00 AM  
 ALT (SGPT) 21 04/25/2019 03:00 AM  
 Alk. phosphatase 69 04/25/2019 03:00 AM  
 
Lab Results Component Value Date/Time Glucose 90 04/25/2019 03:00 AM  
 Glucose (POC) 103 (H) 04/26/2019 04:09 PM  
 
Lab Results Component Value Date/Time Hemoglobin A1c 5.9 04/27/2019 11:27 AM  
 
Hematology Lab Results Component Value Date/Time  WBC 8.5 04/25/2019 03:00 AM  
 RBC 4.16 04/25/2019 03:00 AM  
 HGB 11.8 04/25/2019 03:00 AM  
 HCT 37.6 04/25/2019 03:00 AM  
 MCV 90.4 04/25/2019 03:00 AM  
 MCH 28.4 04/25/2019 03:00 AM  
 MCHC 31.4 04/25/2019 03:00 AM  
 RDW 15.6 (H) 04/25/2019 03:00 AM  
 PLATELET 526 08/46/7464 03:00 AM  
 
Lipids No results found for: CHOL, CHOLX, CHLST, [address], [id number], HDL, LDL, LDLC, DLDLP, TGLX, TRIGL, TRIGP, CHHD, CHHDX Thyroid Function Lab Results Component Value Date/Time TSH 0.29 (L) 04/24/2019 04:20 AM  
 
Pregnancy Status No results found for: [address] , [id number], [id number], [id number], PREGU, POCHCG, MHCGN, HCGQR, THCGA1, SHCG, [address], [address] [place] [address] Assessment/Plan: 
Will order lipid panel to complete the recommended baseline laboratory monitoring based on the patient's current medication regimen.      
Winston Reid, NIAD

## 2019-04-30 VITALS
BODY MASS INDEX: 36.68 KG/M2 | TEMPERATURE: 98.1 F | OXYGEN SATURATION: 99 % | DIASTOLIC BLOOD PRESSURE: 71 MMHG | WEIGHT: 263 LBS | SYSTOLIC BLOOD PRESSURE: 119 MMHG | RESPIRATION RATE: 16 BRPM | HEART RATE: 92 BPM

## 2019-04-30 PROCEDURE — 74011250637 HC RX REV CODE- 250/637: Performed by: NURSE PRACTITIONER

## 2019-04-30 RX ORDER — BUSPIRONE HYDROCHLORIDE 5 MG/1
5 TABLET ORAL 3 TIMES DAILY
Qty: 90 TAB | Refills: 0 | Status: SHIPPED | OUTPATIENT
Start: 2019-04-30

## 2019-04-30 RX ORDER — VENLAFAXINE HYDROCHLORIDE 150 MG/1
300 CAPSULE, EXTENDED RELEASE ORAL
Qty: 60 CAP | Refills: 0 | Status: SHIPPED | OUTPATIENT
Start: 2019-05-01

## 2019-04-30 RX ORDER — QUETIAPINE FUMARATE 200 MG/1
200 TABLET, FILM COATED ORAL
Qty: 30 TAB | Refills: 0 | Status: SHIPPED | OUTPATIENT
Start: 2019-04-30

## 2019-04-30 RX ORDER — CHLORPROMAZINE HYDROCHLORIDE 25 MG/1
25 TABLET, FILM COATED ORAL
Qty: 14 TAB | Refills: 0 | Status: SHIPPED | OUTPATIENT
Start: 2019-04-30

## 2019-04-30 RX ADMIN — CHLORPROMAZINE HYDROCHLORIDE 25 MG: 25 TABLET, SUGAR COATED ORAL at 04:57

## 2019-04-30 RX ADMIN — BUSPIRONE HYDROCHLORIDE 5 MG: 5 TABLET ORAL at 08:40

## 2019-04-30 RX ADMIN — VENLAFAXINE HYDROCHLORIDE 300 MG: 150 CAPSULE, EXTENDED RELEASE ORAL at 08:40

## 2019-04-30 NOTE — PROGRESS NOTES
Laboratory Monitoring for Antipsychotics: This patient is currently prescribed the following medication(s):  
Current Facility-Administered Medications Medication Dose Route Frequency  
 busPIRone (BUSPAR) tablet 5 mg  5 mg Oral TID  
 venlafaxine-SR (EFFEXOR-XR) capsule 300 mg  300 mg Oral DAILY WITH BREAKFAST QUEtiapine (SEROquel) tablet 200 mg  200 mg Oral QHS The following labs have been completed for monitoring of antipsychotics and/or mood stabilizers: 
 
Height, Weight, BMI Estimation Estimated body mass index is 36.68 kg/m² as calculated from the following: 
  Height as of 4/23/19: 180.3 cm (71\"). Weight as of this encounter: 119.3 kg (263 lb). Vital Signs/Blood Pressure Visit Vitals /71 Pulse 92 Temp 98.1 °F (36.7 °C) Resp 16 Wt 119.3 kg (263 lb) SpO2 99% BMI 36.68 kg/m² Renal Function, Hepatic Function and Chemistry Estimated Creatinine Clearance: 90.6 mL/min (A) (based on SCr of 1.21 mg/dL (H)). Lab Results Component Value Date/Time Sodium 141 04/25/2019 03:00 AM  
 Potassium 3.5 04/25/2019 03:00 AM  
 Chloride 107 04/25/2019 03:00 AM  
 CO2 23 04/25/2019 03:00 AM  
 Anion gap 11 04/25/2019 03:00 AM  
 BUN 11 04/25/2019 03:00 AM  
 Creatinine 1.21 (H) 04/25/2019 03:00 AM  
 BUN/Creatinine ratio 9 (L) 04/25/2019 03:00 AM  
 Bilirubin, total 0.2 04/25/2019 03:00 AM  
 Protein, total 6.8 04/25/2019 03:00 AM  
 Albumin 3.2 (L) 04/25/2019 03:00 AM  
 Globulin 3.6 04/25/2019 03:00 AM  
 A-G Ratio 0.9 (L) 04/25/2019 03:00 AM  
 ALT (SGPT) 21 04/25/2019 03:00 AM  
 Alk. phosphatase 69 04/25/2019 03:00 AM  
 
Lab Results Component Value Date/Time Glucose 90 04/25/2019 03:00 AM  
 Glucose (POC) 103 (H) 04/26/2019 04:09 PM  
 
Lab Results Component Value Date/Time Hemoglobin A1c 5.9 04/27/2019 11:27 AM  
 
Hematology Lab Results Component Value Date/Time  WBC 8.5 04/25/2019 03:00 AM  
 RBC 4.16 04/25/2019 03:00 AM  
 HGB 11.8 04/25/2019 03:00 AM  
 HCT 37.6 04/25/2019 03:00 AM  
 MCV 90.4 04/25/2019 03:00 AM  
 MCH 28.4 04/25/2019 03:00 AM  
 MCHC 31.4 04/25/2019 03:00 AM  
 RDW 15.6 (H) 04/25/2019 03:00 AM  
 PLATELET 389 29/79/8937 03:00 AM  
 
Lipids No results found for: CHOL, CHOLX, CHLST, [address], [id number], HDL, LDL, LDLC, DLDLP, TGLX, TRIGL, TRIGP, CHHD, CHHDX Thyroid Function Lab Results Component Value Date/Time TSH 0.29 (L) 04/24/2019 04:20 AM  
 
Pregnancy Status No results found for: [address], [id number], [id number], [id number], PREGU, POCHCG, MHCGN, HCGQR, THCGA1, SHCG, [address], [address] Assessment/Plan: 
Patient refused labs this AM. Nursing staff attempted to draw lipid panel prior to discharge (later in the morning) but patient again refused. Recommend completing lipid panel as outpatient to complete metabolic monitoring.       
Toby Newberry, NIAD

## 2019-04-30 NOTE — BH NOTES
GROUP THERAPY PROGRESS NOTE Clifton Santana is participating in Reflections Group. Group time: 15 minutes Personal goal for participation: learn ways to promote sleep Goal orientation: relaxation Group therapy participation: minimal 
 
Therapeutic interventions reviewed and discussed: Discuss ways to promote sleep Impression of participation: Required prompting to attend and participate. Identified she\" takes medication to help sleep\".

## 2019-04-30 NOTE — PROGRESS NOTES
2300: Patient in bed and appears to be sleeping. Respirations even and unlabored. NAD. Will continue to monitor with q15 minute checks throughout the shift. Problem: Falls - Risk of 
Goal: *Absence of Falls Description Document Moro Beady Fall Risk and appropriate interventions in the flowsheet.  
Outcome: Progressing Towards Goal

## 2019-04-30 NOTE — INTERDISCIPLINARY ROUNDS
Behavioral Health Interdisciplinary Rounds Patient Name: Lindsey Hou  Age: 42 y.o. Room/Bed:  746/02 Primary Diagnosis: <principal problem not specified> Admission Status: Voluntary Readmission within 30 days: no 
Power of  in place: no 
Patient requires a blocked bed: no          Reason for blocked bed: VTE Prophylaxis: Not indicated Mobility needs/Fall risk: no 
Flu Vaccine : no  
Nutritional Plan: no 
Consults:  no       
Labs/Testing due today?: yes Sleep hours:       
Participation in Care/Groups:  yes Medication Compliant?: Yes PRNS (last 24 hours): Antianxiety Restraints (last 24 hours):  no 
  
CIWA (range last 24 hours): COWS (range last 24 hours): Alcohol screening (AUDIT) completed -   AUDIT Score: 4 If applicable, date SBIRT discussed in treatment team AND documented:  
AUDIT Screen Score: AUDIT Score: 4 Tobacco - patient is a smoker: Have You Used Tobacco in the Past 30 Days: Yes Illegal Drugs use: Have You Used Any Illegal Substances Over the Past 12 Months: No(denies upon unit admission) 24 hour chart check complete:  yes Patient goal(s) for today: Discharge Treatment team focus/goals: Discharge Progress note: Patient is stable and ready for discharge LOS:  4  Expected LOS: 4 Financial concerns/prescription coverage: Uninsured Date of last family contact: None Family requesting physician contact today: No 
Discharge plan: Return home Guns in the home: No      
Outpatient provider(s): Dr. Marleen Mortimer Participating treatment team members: VAMSI Dejesus; Stevie Ross NP; Mayra Pugh RN; Rizwan NixonD

## 2019-04-30 NOTE — PROGRESS NOTES
Pharmacist Discharge Medication Reconciliation Discharging Provider: Doug Lopes NP Significant PMH: No past medical history on file. Chief Complaint for this Admission: No chief complaint on file. Allergies: Patient has no known allergies. Discharge Medications:  
Current Discharge Medication List  
  
 
START taking these medications Details  
busPIRone (BUSPAR) 5 mg tablet Take 1 Tab by mouth three (3) times daily. Indications: Repeated Episodes of Anxiety Qty: 90 Tab, Refills: 0  
  
chlorproMAZINE (THORAZINE) 25 mg tablet Take 1 Tab by mouth every six (6) hours as needed (anxiety if atarax is ineffective. ). Indications: anxiety Qty: 14 Tab, Refills: 0  
  
venlafaxine-SR (EFFEXOR-XR) 150 mg capsule Take 2 Caps by mouth daily (with breakfast). Indications: Anxiousness associated with Depression 
Qty: 60 Cap, Refills: 0 CONTINUE these medications which have CHANGED Details QUEtiapine (SEROQUEL) 200 mg tablet Take 1 Tab by mouth nightly. Indications: mood 
Qty: 30 Tab, Refills: 0 STOP taking these medications  
  
 baclofen (LIORESAL) 20 mg tablet Comments:  
Reason for Stopping:   
   
 metroNIDAZOLE (FLAGYL) 500 mg tablet Comments:  
Reason for Stopping:   
   
  
 
The patient's chart, MAR and AVS were reviewed by Haven Alexander, NIAD.

## 2019-04-30 NOTE — PROGRESS NOTES
Problem: Depressed Mood (Adult/Pediatric) Goal: *STG: Participates in treatment plan Outcome: Progressing Towards Goal 
Note: Out on unit social w peers. Brighter and engaged mood stable. Denies SI, no self harming behaviors. Demonstrates appropriate behaviors and ability to follow staff direction and unit. Daily goal is to d/c home staff focus is on d/c plannnig

## 2019-04-30 NOTE — BH NOTES
GROUP THERAPY PROGRESS NOTE Akira Medina partially participated in a morning Process Group on the General Unit with a focus identifying feelings, planning for the day, and learning more about DBT concepts on \"Emotion Regulation. \" . 
Group time: 55 minutes. Personal goal for participation: To increase the capacity to improve ones mood, set personal goals, and understand more about basic activities to help regulate emotions. Goal orientation: The patients will be able to identify their feelings and develop a plan for structuring  
their day. They were also presented with a summary sheet on emotional regulation, in regards to  
focusing on one goal per day, taking physical care of oneself, and recognizing and/or building positive  
experiences. The didactic portion of the session focused on these three concepts:  
1) defining and focusing on one goal per day;  
2) taking care of ones physical maintenance and basic needs  
sleep, nutrition, and exercise; and  
3) finding and building on positive experiences. Group therapy participation: With prompting, this patient marginally participated in the group. She was in group the beginning ten minutes and left without explanation. She returned to the group about five minutes before it ended. Therapeutic interventions reviewed and discussed: The group members were asked to identify an  
emotion they are having and/or let the group know what they want to focus on for the day as they 
continue to make discharge plans. The group members reviewed three DBT suggestions regarding  
emotional regulation, in regards to focusing on one goal per day, taking physical care of oneself, 
and recognizing and/or building positive experiences. It was suggested that these three concepts can be  
seen as headings for their list of coping skills.  The group members were also provided worksheets on the  
 topic discussed for their review and use on their own time. Impression of participation: The patient left group early and may have gone to meet with her treatment team. When she returned late in the session, she said she was feeling \"nervous\" and that she anticipated being discharged later today. She shook her head, 'No,' when asked why she was nervous. She returned to group in time to listen to the last few minutes of the didactic portion of the session. She was alert, generally oriented, but not fully engaged in the group process. She expressed no current SI/HI and displayed no overt psychotic symptoms in this group. Her affect was mostly anxious and her mood reflected her affect. She looked like she was in the process of finalizing her aftercare and discharge plans with her treatment team, especially nursing.

## 2019-04-30 NOTE — BH NOTES
GROUP THERAPY PROGRESS NOTE [place] Ameya is participating in Positive thoughts. Group time: 45 minutes Personal goal for participation: The goal of the group was to understand what affirmations are and how to use them. Also discussed other ways to promote positive thinking such as self care, grounding techniques and objects,  relating them all back to creating a sense of positivity and well being. Goal orientation: personal 
 
Group therapy participation: active Therapeutic interventions reviewed and discussed: Positive self talk, affirmations, self care, grounding Impression of participation: Johanne Larson attended group. Patient stated she was looking forward to discharge. Patient understood concepts and how to apply them to her situation. Patient shared that she has a verse tatooed on her body that is her personal motto or her affirmation.

## 2019-04-30 NOTE — DISCHARGE INSTRUCTIONS
DISCHARGE SUMMARY    NAME:Reid Aparicio  : 1980  MRN: [id number]    The patient Joseline Mcleod exhibits the ability to control behavior in a less restrictive environment. Patient's level of functioning is improving. No assaultive/destructive behavior has been observed for the past 24 hours. No suicidal/homicidal threat or behavior has been observed for the past 24 hours. There is no evidence of serious medication side effects. Patient has not been in physical or protective restraints for at least the past 24 hours. If weapons involved, how are they secured? No weapons involved. Is patient aware of and in agreement with discharge plan? Yes    Arrangements for medication:  Prescriptions given to patient. Copy of discharge instructions to provider?:  Dr. Astrid Martinez for transportation home:  Friend to . Keep all follow up appointments as scheduled, continue to take prescribed medications per physician instructions. Mental health crisis number:  282 or your local mental health crisis line number at [phone]. DISCHARGE SUMMARY from Nurse    PATIENT INSTRUCTIONS:      What to do at Home:  Recommended activity: Activity as tolerated,     If you experience any of the following symptoms thoughts of harming self, feeling overwhelmed with hopelessness, intense anxiety, please follow up with . If you can not reach him and symptoms continue immediately call your local crisis number at [phone]  *  Please give a list of your current medications to your Primary Care Provider. *  Please update this list whenever your medications are discontinued, doses are      changed, or new medications (including over-the-counter products) are added. *  Please carry medication information at all times in case of emergency situations.     These are general instructions for a healthy lifestyle:    No smoking/ No tobacco products/ Avoid exposure to second hand smoke  Surgeon General's Warning:  Quitting smoking now greatly reduces serious risk to your health. Obesity, smoking, and sedentary lifestyle greatly increases your risk for illness    A healthy diet, regular physical exercise & weight monitoring are important for maintaining a healthy lifestyle    You may be retaining fluid if you have a history of heart failure or if you experience any of the following symptoms:  Weight gain of 3 pounds or more overnight or 5 pounds in a week, increased swelling in our hands or feet or shortness of breath while lying flat in bed. Please call your doctor as soon as you notice any of these symptoms; do not wait until your next office visit. Recognize signs and symptoms of STROKE:    F-face looks uneven    A-arms unable to move or move unevenly    S-speech slurred or non-existent    T-time-call 911 as soon as signs and symptoms begin-DO NOT go       Back to bed or wait to see if you get better-TIME IS BRAIN. Warning Signs of HEART ATTACK     Call 911 if you have these symptoms:   Chest discomfort. Most heart attacks involve discomfort in the center of the chest that lasts more than a few minutes, or that goes away and comes back. It can feel like uncomfortable pressure, squeezing, fullness, or pain.  Discomfort in other areas of the upper body. Symptoms can include pain or discomfort in one or both arms, the back, neck, jaw, or stomach.  Shortness of breath with or without chest discomfort.  Other signs may include breaking out in a cold sweat, nausea, or lightheadedness. Don't wait more than five minutes to call 911 - MINUTES MATTER! Fast action can save your life. Calling 911 is almost always the fastest way to get lifesaving treatment. Emergency Medical Services staff can begin treatment when they arrive -- up to an hour sooner than if someone gets to the hospital by car. The discharge information has been reviewed with the patient.   The patient verbalized understanding. Discharge medications reviewed with the patient and appropriate educational materials and side effects teaching were provided. [employer] Activation    Thank you for requesting access to [employer]. Please follow the instructions below to securely access and download your online medical record. [employer] allows you to send messages to your doctor, view your test results, renew your prescriptions, schedule appointments, and more. How Do I Sign Up? 1. In your internet browser, go to [email or web address]  2. Click on the First Time User? Click Here link in the Sign In box. You will be redirect to the New Member Sign Up page. 3. Enter your [employer] Access Code exactly as it appears below. You will not need to use this code after youve completed the sign-up process. If you do not sign up before the expiration date, you must request a new code. [employer] Access Code: [email or web address]  Expires: 2019  9:57 AM (This is the date your [employer] access code will )    4. Enter the last four digits of your Social Security Number (xxxx) and Date of Birth (mm/dd/yyyy) as indicated and click Submit. You will be taken to the next sign-up page. 5. Create a [employer] ID. This will be your [employer] login ID and cannot be changed, so think of one that is secure and easy to remember. 6. Create a [employer] password. You can change your password at any time. 7. Enter your Password Reset Question and Answer. This can be used at a later time if you forget your password. 8. Enter your e-mail address. You will receive e-mail notification when new information is available in [address] 9. Click Sign Up. You can now view and download portions of your medical record. 10. Click the Download Summary menu link to download a portable copy of your medical information.     Additional Information    If you have questions, please visit the Frequently Asked Questions section of the [employer] website at [email or web address] Remember, MyChart is NOT to be used for urgent needs.  For medical emergencies, dial 911.        ___________________________________________________________________________________________________________________________________

## 2019-04-30 NOTE — BH NOTES
Behavioral Health Transition Record to Provider Patient Name: Joseline Mcleod YOB: 1980 Medical Record Number: [id number] Date of Admission: 4/26/2019 Date of Discharge: 4/30/2019 Attending Provider: Rolly Ball MD 
Discharging Provider: Rolly Ball MD 
To contact this individual call [phone] and ask the  to page. If unavailable, ask to be transferred to [place] Provider on call. [facility] Provider will be available on call 24/7 and during holidays. Primary Care Provider: UNKNOWN 
 
No Known Allergies Reason for Admission: The patient is a 42-year-old female who is currently admitted at [address] [place] [address] on a voluntary basis. She was at [facility] in the medical unit for a few days and was cleared for medical transfer here at [facility]. Admission Diagnosis: Bipolar disorder ([facility] [address] [F31.9] * No surgery found * Results for orders placed or performed during the hospital encounter of 04/26/19 HEMOGLOBIN A1C WITH EAG Result Value Ref Range Hemoglobin A1c 5.9 4.2 - 6.3 % Est. average glucose 123 mg/dL Immunizations administered during this encounter: There is no immunization history on file for this patient. Screening for Metabolic Disorders for Patients on Antipsychotic Medications 
(Data obtained from the EMR) Estimated Body Mass Index Estimated body mass index is 36.68 kg/m² as calculated from the following: 
  Height as of 4/23/19: 5' 11\" (1.803 m). Weight as of this encounter: 119.3 kg (263 lb). Vital Signs/Blood Pressure Visit Vitals /71 Pulse 92 Temp 98.1 °F (36.7 °C) Resp 16 Wt 119.3 kg (263 lb) SpO2 99% BMI 36.68 kg/m² Blood Glucose/Hemoglobin A1c Lab Results Component Value Date/Time Glucose 90 04/25/2019 03:00 AM  
 Glucose (POC) 103 (H) 04/26/2019 04:09 PM  
 
Lab Results Component Value Date/Time Hemoglobin A1c 5.9 2019 11:27 AM  
 
  
Lipid Panel No results found for: CHOL, CHOLX, CHLST, [address], [id number], HDL, LDL, LDLC, DLDLP, TGLX, TRIGL, TRIGP, CHHD, CHHDX Discharge Diagnosis: Bipolar disorder (ICD-10-CM: F31.9) Discharge Plan: Patient discharged home into the care of a friend. DISCHARGE SUMMARYNAME:Reid Hicks : 1980 MRN: [id number] The patient [place] exhibits the ability to control behavior in a less restrictive environment. Patient's level of functioning is improving. No assaultive/destructive behavior has been observed for the past 24 hours. No suicidal/homicidal threat or behavior has been observed for the past 24 hours. There is no evidence of serious medication side effects. Patient has not been in physical or protective restraints for at least the past 24 hours. If weapons involved, how are they secured? No weapons involved. Is patient aware of and in agreement with discharge plan? Yes Arrangements for medication:  Prescriptions given to patient. Copy of discharge instructions to provider?:  Dr. Remington Elmore Arrangements for transportation home:  Friend to . Keep all follow up appointments as scheduled, continue to take prescribed medications per physician instructions. Mental health crisis number:  [phone] or your local mental health crisis line number at [phone]. Discharge Medication List and Instructions:  
Current Discharge Medication List  
  
START taking these medications Details  
busPIRone (BUSPAR) 5 mg tablet Take 1 Tab by mouth three (3) times daily. Indications: Repeated Episodes of Anxiety Qty: 90 Tab, Refills: 0  
  
chlorproMAZINE (THORAZINE) 25 mg tablet Take 1 Tab by mouth every six (6) hours as needed (anxiety if atarax is ineffective. ). Indications: anxiety Qty: 14 Tab, Refills: 0  
  
venlafaxine-SR (EFFEXOR-XR) 150 mg capsule Take 2 Caps by mouth daily (with breakfast). Indications: Anxiousness associated with Depression 
Qty: 60 Cap, Refills: 0 CONTINUE these medications which have CHANGED Details QUEtiapine (SEROQUEL) 200 mg tablet Take 1 Tab by mouth nightly. Indications: mood 
Qty: 30 Tab, Refills: 0 STOP taking these medications  
  
 baclofen (LIORESAL) 20 mg tablet Comments:  
Reason for Stopping:   
   
 metroNIDAZOLE (FLAGYL) 500 mg tablet Comments:  
Reason for Stopping:   
   
  
 
 
Unresulted Labs (24h ago, onward) Start     Ordered 04/30/19 0600  LIPID PANEL  TOMORROW AM,   R Comments:  Routine monitoring - antipsychotic Start Status 04/30/19 0600  Order ID: [id number]  
  
 04/29/19 8325 To obtain results of studies pending at discharge, please contact [phone] Follow-up Information Follow up With Specialties Details Why Contact Logan Callejas Aas  On 5/1/2019 You have a 1:00pm appointment with your psychiatrist. [address] [place] [address] [address] [place], [address] [place] [address] 
[phone] [facility])  Go on 5/1/2019 Walk-in Monday through Thursday between 8:30am and 10:30am to register for services. [address] [facility], [address] [place] [address] 
[phone] UNKNOWN Advanced Directive:  
Does the patient have an appointed surrogate decision maker? No 
Does the patient have a Medical Advance Directive? No 
Does the patient have a Psychiatric Advance Directive? No 
If the patient does not have a surrogate or Medical Advance Directive AND Psychiatric Advance Directive, the patient was offered information on these advance directives Yes and Patient declined to complete Patient Instructions: Please continue all medications until otherwise directed by physician. Tobacco Cessation Discharge Plan:  
Is the patient a smoker and needs referral for smoking cessation? Yes Patient referred to the following for smoking cessation with an appointment? Refused Patient was offered medication to assist with smoking cessation at discharge? Refused Was education for smoking cessation added to the discharge instructions? Yes Alcohol/Substance Abuse Discharge Plan:  
Does the patient have a history of substance/alcohol abuse and requires a referral for treatment? Yes Patient referred to the following for substance/alcohol abuse treatment with an appointment? Yes, Patient has an appointment with Dr. Geoff Nguyen on 5/1/19 at 1:00pm. 
Patient was offered medication to assist with alcohol cessation at discharge? Refused Was education for substance/alcohol abuse added to discharge instructions? Yes Patient discharged to Home; discussed with patient/caregiver and provided to the patient/caregiver either in hard copy or electronically.
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
